# Patient Record
Sex: FEMALE | Race: WHITE | NOT HISPANIC OR LATINO | URBAN - METROPOLITAN AREA
[De-identification: names, ages, dates, MRNs, and addresses within clinical notes are randomized per-mention and may not be internally consistent; named-entity substitution may affect disease eponyms.]

---

## 2017-01-16 ENCOUNTER — OFFICE VISIT - HEALTHEAST (OUTPATIENT)
Dept: FAMILY MEDICINE | Facility: CLINIC | Age: 37
End: 2017-01-16

## 2017-01-16 DIAGNOSIS — Z00.00 ANNUAL PHYSICAL EXAM: ICD-10-CM

## 2017-01-16 DIAGNOSIS — J45.20 INTERMITTENT ASTHMA: ICD-10-CM

## 2017-01-16 DIAGNOSIS — Z80.3 FAMILY HISTORY OF BREAST CANCER: ICD-10-CM

## 2017-01-16 DIAGNOSIS — Z31.9 INFERTILITY MANAGEMENT: ICD-10-CM

## 2017-01-16 ASSESSMENT — MIFFLIN-ST. JEOR: SCORE: 1486.89

## 2017-01-22 ENCOUNTER — COMMUNICATION - HEALTHEAST (OUTPATIENT)
Dept: FAMILY MEDICINE | Facility: CLINIC | Age: 37
End: 2017-01-22

## 2017-01-23 ENCOUNTER — COMMUNICATION - HEALTHEAST (OUTPATIENT)
Dept: ONCOLOGY | Facility: HOSPITAL | Age: 37
End: 2017-01-23

## 2017-02-13 ENCOUNTER — COMMUNICATION - HEALTHEAST (OUTPATIENT)
Dept: SCHEDULING | Facility: CLINIC | Age: 37
End: 2017-02-13

## 2017-02-20 ENCOUNTER — OFFICE VISIT - HEALTHEAST (OUTPATIENT)
Dept: ONCOLOGY | Facility: HOSPITAL | Age: 37
End: 2017-02-20

## 2017-02-20 ENCOUNTER — RECORDS - HEALTHEAST (OUTPATIENT)
Dept: ADMINISTRATIVE | Facility: OTHER | Age: 37
End: 2017-02-20

## 2017-02-20 ENCOUNTER — COMMUNICATION - HEALTHEAST (OUTPATIENT)
Dept: ONCOLOGY | Facility: HOSPITAL | Age: 37
End: 2017-02-20

## 2017-02-20 DIAGNOSIS — Z80.3 FAMILY HISTORY OF BREAST CANCER: ICD-10-CM

## 2017-03-09 ENCOUNTER — COMMUNICATION - HEALTHEAST (OUTPATIENT)
Dept: FAMILY MEDICINE | Facility: CLINIC | Age: 37
End: 2017-03-09

## 2017-03-13 ENCOUNTER — RECORDS - HEALTHEAST (OUTPATIENT)
Dept: ADMINISTRATIVE | Facility: OTHER | Age: 37
End: 2017-03-13

## 2017-05-17 ENCOUNTER — COMMUNICATION - HEALTHEAST (OUTPATIENT)
Dept: SCHEDULING | Facility: CLINIC | Age: 37
End: 2017-05-17

## 2017-05-17 ENCOUNTER — OFFICE VISIT - HEALTHEAST (OUTPATIENT)
Dept: FAMILY MEDICINE | Facility: CLINIC | Age: 37
End: 2017-05-17

## 2017-05-17 DIAGNOSIS — R07.9 CHEST PAIN, UNSPECIFIED TYPE: ICD-10-CM

## 2017-05-17 ASSESSMENT — MIFFLIN-ST. JEOR: SCORE: 1477.81

## 2017-05-22 ENCOUNTER — RECORDS - HEALTHEAST (OUTPATIENT)
Dept: GENERAL RADIOLOGY | Facility: CLINIC | Age: 37
End: 2017-05-22

## 2017-05-22 ENCOUNTER — OFFICE VISIT - HEALTHEAST (OUTPATIENT)
Dept: FAMILY MEDICINE | Facility: CLINIC | Age: 37
End: 2017-05-22

## 2017-05-22 DIAGNOSIS — J93.83 SPONTANEOUS PNEUMOTHORAX: ICD-10-CM

## 2017-05-22 DIAGNOSIS — R06.02 SHORTNESS OF BREATH: ICD-10-CM

## 2017-05-22 DIAGNOSIS — J93.83 OTHER PNEUMOTHORAX: ICD-10-CM

## 2017-05-22 RX ORDER — ALBUTEROL SULFATE 90 UG/1
2 AEROSOL, METERED RESPIRATORY (INHALATION) EVERY 6 HOURS PRN
Qty: 1 INHALER | Refills: 1 | Status: SHIPPED | OUTPATIENT
Start: 2017-05-22

## 2017-05-26 ENCOUNTER — OFFICE VISIT - HEALTHEAST (OUTPATIENT)
Dept: PULMONOLOGY | Facility: OTHER | Age: 37
End: 2017-05-26

## 2017-05-26 ENCOUNTER — HOSPITAL ENCOUNTER (OUTPATIENT)
Dept: RADIOLOGY | Facility: HOSPITAL | Age: 37
Discharge: HOME | End: 2017-05-26
Attending: INTERNAL MEDICINE

## 2017-05-26 ENCOUNTER — COMMUNICATION - HEALTHEAST (OUTPATIENT)
Dept: TELEHEALTH | Facility: CLINIC | Age: 37
End: 2017-05-26

## 2017-05-26 ENCOUNTER — RECORDS - HEALTHEAST (OUTPATIENT)
Dept: ADMINISTRATIVE | Facility: OTHER | Age: 37
End: 2017-05-26

## 2017-05-26 DIAGNOSIS — J93.9 PNEUMOTHORAX ON RIGHT: ICD-10-CM

## 2017-05-26 DIAGNOSIS — J45.909 ASTHMA: ICD-10-CM

## 2017-06-19 ENCOUNTER — HOSPITAL ENCOUNTER (OUTPATIENT)
Dept: RADIOLOGY | Facility: HOSPITAL | Age: 37
Discharge: HOME | End: 2017-06-19
Attending: INTERNAL MEDICINE

## 2017-06-19 ENCOUNTER — OFFICE VISIT - HEALTHEAST (OUTPATIENT)
Dept: PULMONOLOGY | Facility: OTHER | Age: 37
End: 2017-06-19

## 2017-06-19 DIAGNOSIS — J45.990 EXERCISE-INDUCED ASTHMA: ICD-10-CM

## 2017-06-19 DIAGNOSIS — J93.9 PNEUMOTHORAX: ICD-10-CM

## 2017-06-19 DIAGNOSIS — J45.909 ASTHMA: ICD-10-CM

## 2017-06-19 DIAGNOSIS — J93.9 PNEUMOTHORAX ON RIGHT: ICD-10-CM

## 2017-08-09 ENCOUNTER — COMMUNICATION - HEALTHEAST (OUTPATIENT)
Dept: SCHEDULING | Facility: CLINIC | Age: 37
End: 2017-08-09

## 2017-08-10 ENCOUNTER — OFFICE VISIT - HEALTHEAST (OUTPATIENT)
Dept: FAMILY MEDICINE | Facility: CLINIC | Age: 37
End: 2017-08-10

## 2017-08-10 ENCOUNTER — RECORDS - HEALTHEAST (OUTPATIENT)
Dept: ADMINISTRATIVE | Facility: OTHER | Age: 37
End: 2017-08-10

## 2017-08-10 ENCOUNTER — ANESTHESIA - HEALTHEAST (OUTPATIENT)
Dept: SURGERY | Facility: CLINIC | Age: 37
End: 2017-08-10

## 2017-08-10 DIAGNOSIS — J93.83 SPONTANEOUS PNEUMOTHORAX: ICD-10-CM

## 2017-08-10 DIAGNOSIS — J30.2 SEASONAL ALLERGIES: ICD-10-CM

## 2017-08-10 DIAGNOSIS — L70.9 ACNE: ICD-10-CM

## 2017-08-10 DIAGNOSIS — R05.9 COUGH: ICD-10-CM

## 2017-08-10 DIAGNOSIS — J45.20 INTERMITTENT ASTHMA: ICD-10-CM

## 2017-08-10 ASSESSMENT — MIFFLIN-ST. JEOR
SCORE: 1482.35
SCORE: 1487.43

## 2017-08-11 ENCOUNTER — SURGERY - HEALTHEAST (OUTPATIENT)
Dept: SURGERY | Facility: CLINIC | Age: 37
End: 2017-08-11

## 2017-08-11 ASSESSMENT — MIFFLIN-ST. JEOR: SCORE: 1481.44

## 2017-08-12 ASSESSMENT — MIFFLIN-ST. JEOR: SCORE: 1494.6

## 2017-08-16 ENCOUNTER — COMMUNICATION - HEALTHEAST (OUTPATIENT)
Dept: FAMILY MEDICINE | Facility: CLINIC | Age: 37
End: 2017-08-16

## 2017-08-19 ENCOUNTER — COMMUNICATION - HEALTHEAST (OUTPATIENT)
Dept: SCHEDULING | Facility: CLINIC | Age: 37
End: 2017-08-19

## 2017-08-21 ENCOUNTER — COMMUNICATION - HEALTHEAST (OUTPATIENT)
Dept: PULMONOLOGY | Facility: OTHER | Age: 37
End: 2017-08-21

## 2017-08-21 ENCOUNTER — RECORDS - HEALTHEAST (OUTPATIENT)
Dept: GENERAL RADIOLOGY | Facility: CLINIC | Age: 37
End: 2017-08-21

## 2017-08-21 ENCOUNTER — AMBULATORY - HEALTHEAST (OUTPATIENT)
Dept: PULMONOLOGY | Facility: OTHER | Age: 37
End: 2017-08-21

## 2017-08-21 ENCOUNTER — OFFICE VISIT - HEALTHEAST (OUTPATIENT)
Dept: FAMILY MEDICINE | Facility: CLINIC | Age: 37
End: 2017-08-21

## 2017-08-21 DIAGNOSIS — R09.89 OTHER SPECIFIED SYMPTOMS AND SIGNS INVOLVING THE CIRCULATORY AND RESPIRATORY SYSTEMS: ICD-10-CM

## 2017-08-21 DIAGNOSIS — Z09 ENCOUNTER FOR FOLLOW-UP EXAMINATION AFTER COMPLETED TREATMENT FOR CONDITIONS OTHER THAN MALIGNANT NEOPLASM: ICD-10-CM

## 2017-08-21 DIAGNOSIS — R06.89 DECREASED BREATH SOUNDS IN MIDDLE FIELD ON RIGHT SIDE OF CHEST: ICD-10-CM

## 2017-08-21 DIAGNOSIS — J93.9 PNEUMOTHORAX: ICD-10-CM

## 2017-08-21 DIAGNOSIS — Z09 FOLLOW-UP EXAMINATION AFTER LUNG SURGERY: ICD-10-CM

## 2017-08-21 ASSESSMENT — MIFFLIN-ST. JEOR
SCORE: 1455.13
SCORE: 1473.28

## 2017-08-22 ENCOUNTER — SURGERY - HEALTHEAST (OUTPATIENT)
Dept: SURGERY | Facility: CLINIC | Age: 37
End: 2017-08-22

## 2017-08-22 ENCOUNTER — ANESTHESIA - HEALTHEAST (OUTPATIENT)
Dept: SURGERY | Facility: CLINIC | Age: 37
End: 2017-08-22

## 2017-08-23 ASSESSMENT — MIFFLIN-ST. JEOR: SCORE: 1460.58

## 2017-08-26 ASSESSMENT — MIFFLIN-ST. JEOR: SCORE: 1532.24

## 2017-08-27 ENCOUNTER — ANESTHESIA - HEALTHEAST (OUTPATIENT)
Dept: SURGERY | Facility: CLINIC | Age: 37
End: 2017-08-27

## 2017-08-28 ASSESSMENT — MIFFLIN-ST. JEOR
SCORE: 1533.61
SCORE: 1527.71

## 2017-08-30 ENCOUNTER — COMMUNICATION - HEALTHEAST (OUTPATIENT)
Dept: FAMILY MEDICINE | Facility: CLINIC | Age: 37
End: 2017-08-30

## 2017-08-31 ENCOUNTER — AMBULATORY - HEALTHEAST (OUTPATIENT)
Dept: PULMONOLOGY | Facility: OTHER | Age: 37
End: 2017-08-31

## 2017-08-31 DIAGNOSIS — J93.9 PNEUMOTHORAX ON RIGHT: ICD-10-CM

## 2017-09-01 ENCOUNTER — COMMUNICATION - HEALTHEAST (OUTPATIENT)
Dept: PULMONOLOGY | Facility: OTHER | Age: 37
End: 2017-09-01

## 2017-09-03 ENCOUNTER — COMMUNICATION - HEALTHEAST (OUTPATIENT)
Dept: PULMONOLOGY | Facility: OTHER | Age: 37
End: 2017-09-03

## 2017-09-05 ENCOUNTER — OFFICE VISIT - HEALTHEAST (OUTPATIENT)
Dept: FAMILY MEDICINE | Facility: CLINIC | Age: 37
End: 2017-09-05

## 2017-09-05 ENCOUNTER — RECORDS - HEALTHEAST (OUTPATIENT)
Dept: GENERAL RADIOLOGY | Facility: CLINIC | Age: 37
End: 2017-09-05

## 2017-09-05 DIAGNOSIS — J93.9 PNEUMOTHORAX, UNSPECIFIED: ICD-10-CM

## 2017-09-05 DIAGNOSIS — J93.83 SPONTANEOUS PNEUMOTHORAX: ICD-10-CM

## 2017-09-05 DIAGNOSIS — J93.11 PRIMARY SPONTANEOUS PNEUMOTHORAX: ICD-10-CM

## 2017-09-06 ENCOUNTER — AMBULATORY - HEALTHEAST (OUTPATIENT)
Dept: PULMONOLOGY | Facility: OTHER | Age: 37
End: 2017-09-06

## 2017-09-06 ENCOUNTER — RECORDS - HEALTHEAST (OUTPATIENT)
Dept: ADMINISTRATIVE | Facility: OTHER | Age: 37
End: 2017-09-06

## 2017-09-07 ENCOUNTER — COMMUNICATION - HEALTHEAST (OUTPATIENT)
Dept: FAMILY MEDICINE | Facility: CLINIC | Age: 37
End: 2017-09-07

## 2017-09-12 ENCOUNTER — COMMUNICATION - HEALTHEAST (OUTPATIENT)
Dept: SCHEDULING | Facility: CLINIC | Age: 37
End: 2017-09-12

## 2017-09-12 DIAGNOSIS — L70.9 ACNE: ICD-10-CM

## 2017-09-12 DIAGNOSIS — L98.9 SKIN LESION: ICD-10-CM

## 2017-09-26 ENCOUNTER — OFFICE VISIT - HEALTHEAST (OUTPATIENT)
Dept: FAMILY MEDICINE | Facility: CLINIC | Age: 37
End: 2017-09-26

## 2017-09-26 ENCOUNTER — RECORDS - HEALTHEAST (OUTPATIENT)
Dept: GENERAL RADIOLOGY | Facility: CLINIC | Age: 37
End: 2017-09-26

## 2017-09-26 ENCOUNTER — COMMUNICATION - HEALTHEAST (OUTPATIENT)
Dept: SCHEDULING | Facility: CLINIC | Age: 37
End: 2017-09-26

## 2017-09-26 DIAGNOSIS — R06.02 SOB (SHORTNESS OF BREATH): ICD-10-CM

## 2017-09-26 DIAGNOSIS — R06.02 SHORTNESS OF BREATH: ICD-10-CM

## 2017-09-27 ENCOUNTER — COMMUNICATION - HEALTHEAST (OUTPATIENT)
Dept: PULMONOLOGY | Facility: OTHER | Age: 37
End: 2017-09-27

## 2017-09-29 ENCOUNTER — COMMUNICATION - HEALTHEAST (OUTPATIENT)
Dept: PULMONOLOGY | Facility: OTHER | Age: 37
End: 2017-09-29

## 2017-10-19 ENCOUNTER — OFFICE VISIT - HEALTHEAST (OUTPATIENT)
Dept: PULMONOLOGY | Facility: OTHER | Age: 37
End: 2017-10-19

## 2017-10-19 ENCOUNTER — COMMUNICATION - HEALTHEAST (OUTPATIENT)
Dept: FAMILY MEDICINE | Facility: CLINIC | Age: 37
End: 2017-10-19

## 2017-10-19 ENCOUNTER — RECORDS - HEALTHEAST (OUTPATIENT)
Dept: ADMINISTRATIVE | Facility: OTHER | Age: 37
End: 2017-10-19

## 2017-10-19 DIAGNOSIS — J93.9 PNEUMOTHORAX: ICD-10-CM

## 2017-11-16 ENCOUNTER — COMMUNICATION - HEALTHEAST (OUTPATIENT)
Dept: SCHEDULING | Facility: CLINIC | Age: 37
End: 2017-11-16

## 2017-11-17 ENCOUNTER — RECORDS - HEALTHEAST (OUTPATIENT)
Dept: GENERAL RADIOLOGY | Facility: CLINIC | Age: 37
End: 2017-11-17

## 2017-11-17 ENCOUNTER — OFFICE VISIT - HEALTHEAST (OUTPATIENT)
Dept: FAMILY MEDICINE | Facility: CLINIC | Age: 37
End: 2017-11-17

## 2017-11-17 ENCOUNTER — OFFICE VISIT - HEALTHEAST (OUTPATIENT)
Dept: PULMONOLOGY | Facility: OTHER | Age: 37
End: 2017-11-17

## 2017-11-17 ENCOUNTER — RECORDS - HEALTHEAST (OUTPATIENT)
Dept: ADMINISTRATIVE | Facility: OTHER | Age: 37
End: 2017-11-17

## 2017-11-17 DIAGNOSIS — Z80.3 FAMILY HISTORY OF BREAST CANCER: ICD-10-CM

## 2017-11-17 DIAGNOSIS — J93.83 SPONTANEOUS PNEUMOTHORAX: ICD-10-CM

## 2017-11-17 DIAGNOSIS — J45.20 MILD INTERMITTENT ASTHMA WITHOUT COMPLICATION: ICD-10-CM

## 2017-11-17 DIAGNOSIS — J93.83 OTHER PNEUMOTHORAX: ICD-10-CM

## 2017-11-17 DIAGNOSIS — R06.00 DYSPNEA: ICD-10-CM

## 2017-11-17 DIAGNOSIS — J45.20 MILD INTERMITTENT ASTHMA, UNCOMPLICATED: ICD-10-CM

## 2017-11-17 DIAGNOSIS — R06.02 SHORTNESS OF BREATH: ICD-10-CM

## 2017-11-17 DIAGNOSIS — R07.1 CHEST PAIN ON BREATHING: ICD-10-CM

## 2017-11-17 LAB
ATRIAL RATE - MUSE: 69 BPM
DIASTOLIC BLOOD PRESSURE - MUSE: NORMAL MMHG
INTERPRETATION ECG - MUSE: NORMAL
P AXIS - MUSE: 27 DEGREES
PR INTERVAL - MUSE: 142 MS
QRS DURATION - MUSE: 86 MS
QT - MUSE: 424 MS
QTC - MUSE: 454 MS
R AXIS - MUSE: -16 DEGREES
SYSTOLIC BLOOD PRESSURE - MUSE: NORMAL MMHG
T AXIS - MUSE: 53 DEGREES
VENTRICULAR RATE- MUSE: 69 BPM

## 2017-11-17 ASSESSMENT — MIFFLIN-ST. JEOR: SCORE: 1486.89

## 2017-12-04 ENCOUNTER — OFFICE VISIT - HEALTHEAST (OUTPATIENT)
Dept: FAMILY MEDICINE | Facility: CLINIC | Age: 37
End: 2017-12-04

## 2017-12-04 DIAGNOSIS — J32.9 SINUSITIS: ICD-10-CM

## 2017-12-04 DIAGNOSIS — J02.9 SORE THROAT: ICD-10-CM

## 2017-12-04 DIAGNOSIS — J06.9 ACUTE URI: ICD-10-CM

## 2018-02-02 ENCOUNTER — OFFICE VISIT - HEALTHEAST (OUTPATIENT)
Dept: FAMILY MEDICINE | Facility: CLINIC | Age: 38
End: 2018-02-02

## 2018-02-02 DIAGNOSIS — R05.9 COUGH: ICD-10-CM

## 2018-02-02 LAB
FLUAV AG SPEC QL IA: NORMAL
FLUBV AG SPEC QL IA: NORMAL

## 2018-02-02 ASSESSMENT — MIFFLIN-ST. JEOR: SCORE: 1500.49

## 2018-03-16 ENCOUNTER — OFFICE VISIT - HEALTHEAST (OUTPATIENT)
Dept: FAMILY MEDICINE | Facility: CLINIC | Age: 38
End: 2018-03-16

## 2018-03-16 DIAGNOSIS — R42 DIZZINESS: ICD-10-CM

## 2018-03-16 DIAGNOSIS — G89.18 POST-OPERATIVE PAIN: ICD-10-CM

## 2018-03-16 DIAGNOSIS — Z31.9 INFERTILITY MANAGEMENT: ICD-10-CM

## 2018-03-16 DIAGNOSIS — J93.83 SPONTANEOUS PNEUMOTHORAX: ICD-10-CM

## 2018-03-16 DIAGNOSIS — Z00.00 ANNUAL PHYSICAL EXAM: ICD-10-CM

## 2018-03-16 DIAGNOSIS — J02.9 SORE THROAT: ICD-10-CM

## 2018-03-16 DIAGNOSIS — J45.20 MILD INTERMITTENT ASTHMA WITHOUT COMPLICATION: ICD-10-CM

## 2018-03-16 LAB
CHOLEST SERPL-MCNC: 153 MG/DL
DEPRECATED S PYO AG THROAT QL EIA: NORMAL
FASTING STATUS PATIENT QL REPORTED: YES
FASTING STATUS PATIENT QL REPORTED: YES
GLUCOSE BLD-MCNC: 97 MG/DL (ref 70–99)
HDLC SERPL-MCNC: 62 MG/DL
HGB BLD-MCNC: 14 G/DL (ref 12–16)
LDLC SERPL CALC-MCNC: 80 MG/DL
TRIGL SERPL-MCNC: 54 MG/DL

## 2018-03-16 ASSESSMENT — MIFFLIN-ST. JEOR: SCORE: 1489.15

## 2018-03-17 LAB — GROUP A STREP BY PCR: NORMAL

## 2018-03-28 ENCOUNTER — COMMUNICATION - HEALTHEAST (OUTPATIENT)
Dept: FAMILY MEDICINE | Facility: CLINIC | Age: 38
End: 2018-03-28

## 2018-04-09 ENCOUNTER — COMMUNICATION - HEALTHEAST (OUTPATIENT)
Dept: SCHEDULING | Facility: CLINIC | Age: 38
End: 2018-04-09

## 2021-05-30 VITALS — HEIGHT: 71 IN | BODY MASS INDEX: 22.4 KG/M2 | WEIGHT: 160 LBS

## 2021-05-31 VITALS — BODY MASS INDEX: 22.32 KG/M2 | WEIGHT: 160 LBS

## 2021-05-31 VITALS — BODY MASS INDEX: 22.42 KG/M2 | WEIGHT: 160.12 LBS | HEIGHT: 71 IN

## 2021-05-31 VITALS — WEIGHT: 158 LBS | BODY MASS INDEX: 22.12 KG/M2 | HEIGHT: 71 IN

## 2021-05-31 VITALS — BODY MASS INDEX: 22.4 KG/M2 | HEIGHT: 71 IN | WEIGHT: 160 LBS

## 2021-05-31 VITALS — HEIGHT: 71 IN | BODY MASS INDEX: 23.66 KG/M2 | WEIGHT: 169 LBS

## 2021-05-31 VITALS — BODY MASS INDEX: 21.34 KG/M2 | WEIGHT: 153 LBS

## 2021-05-31 VITALS — WEIGHT: 158.8 LBS | BODY MASS INDEX: 22.15 KG/M2

## 2021-05-31 VITALS — WEIGHT: 160.3 LBS | BODY MASS INDEX: 22.36 KG/M2

## 2021-05-31 VITALS — BODY MASS INDEX: 22.18 KG/M2 | WEIGHT: 159 LBS

## 2021-05-31 VITALS — WEIGHT: 160 LBS | BODY MASS INDEX: 22.32 KG/M2

## 2021-05-31 VITALS — BODY MASS INDEX: 21.62 KG/M2 | WEIGHT: 155 LBS

## 2021-05-31 VITALS — WEIGHT: 156 LBS | BODY MASS INDEX: 21.76 KG/M2

## 2021-05-31 VITALS — BODY MASS INDEX: 22.33 KG/M2 | WEIGHT: 160.12 LBS

## 2021-05-31 VITALS — BODY MASS INDEX: 22.64 KG/M2 | WEIGHT: 161.7 LBS | HEIGHT: 71 IN

## 2021-06-01 VITALS — HEIGHT: 71 IN | WEIGHT: 163 LBS | BODY MASS INDEX: 22.82 KG/M2

## 2021-06-01 VITALS — BODY MASS INDEX: 21.26 KG/M2 | HEIGHT: 72 IN | WEIGHT: 157 LBS

## 2021-06-08 NOTE — PROGRESS NOTES
"  Subjective:     Anupama Miranda is a 36 y.o. female who presents for an annual exam.     Other concerns today:  1.  Infertility.  She has had ongoing problems with infertility.  Please see her last note for details.  She has had some infertility workup done at her previous clinic.  I referred her to [facility] recently for follow-up.  Unfortunately, patient has had a lot of difficulty with the provider she is seeing there.  She is seeing a nurse practitioner named Thuy.  Patient has , which often necessitates more detailed paperwork for insurance.  Patient says there have been 2 times where the provider has entered the wrong paperwork, patient has had poor follow-up with the provider, she's had to call back and forth on multiple occasions.  Also her old clinic apparently sent the provider a CD of patient's old records, and the records apparently never showed up.  She has not heard anything as to her next steps in the process.  She thinks she will next get an ultrasound and some bloodwork in relation to menstrual cycle timing.  She is frustrated.  She feels like she has wasted all of this time.  She would be amenable to seeing a different provider at [facility], or switching to another clinic.  Overall, she denies any significant problems with anxiety or depression.  She is frustrated with this specific incident or series of events.    2.  Family history of breast cancer.  Her maternal aunt was just recently diagnosed breast cancer at age 55.  She has a family history of maternal grandmother with breast cancer.  Patient had a \"baseline\" mammogram done at age 34 which was grossly normal, she reports.  Patient is a nonsmoker.  Her aunt got some genetic testing done.  I reviewed emails she sent to patient today.  It did not look like aunt tested positive for  BRCA1/2 gene(s), but possibly some other at-risk gene?  Patient wants to know if she is at higher risk, if she needs extra/different " "screening.    Immunization History   Administered Date(s) Administered     Influenza, inj, historic 10/18/2016     Gynecologic History  Patient's last menstrual period was 12/31/2016.  Contraception: none  Last Pap: 1/1/2016  Last mammogram: 2015, \"baseline\". Results were: normal per pt report    Current Outpatient Prescriptions:      albuterol (PROVENTIL HFA;VENTOLIN HFA) 90 mcg/actuation inhaler, Inhale 2 puffs every 6 (six) hours as needed for wheezing., Disp: , Rfl:      clindamycin (CLEOCIN T) 1 % lotion, Apply topically 2 (two) times a day., Disp: , Rfl:      FINACEA 15 % gel, , Disp: , Rfl:      tretinoin (RETIN-A) 0.05 % cream, Apply topically bedtime., Disp: , Rfl:   Past Medical History   Diagnosis Date     Miscarriage      Past Surgical History   Procedure Laterality Date     Dilation and curettage of uterus       Ambien [zolpidem]  Family History   Problem Relation Age of Onset     Breast cancer Maternal Grandmother      Stroke Maternal Grandmother      Hypertension Maternal Grandfather      Coronary artery disease Maternal Grandfather      Diabetes Paternal Grandmother      Alcohol abuse Paternal Aunt      Breast cancer Maternal Aunt      Diabetes Paternal Uncle      Social History     Social History     Marital status:      Spouse name: N/A     Number of children: N/A     Years of education: N/A     Occupational History     Not on file.     Social History Main Topics     Smoking status: Never Smoker     Smokeless tobacco: Not on file     Alcohol use Not on file     Drug use: Not on file     Sexual activity: Not on file     Other Topics Concern     Not on file     Social History Narrative     Review of Systems  Complete Review of Systems is discussed with patient and is negative except as noted in HPI.    Objective:     Vitals:    01/16/17 1016   BP: 104/72   Pulse: 60   Resp: 16   Temp: 97.6  F (36.4  C)   SpO2: 98%     Body mass index is 22.32 kg/(m^2).    Physical Exam:  General: Patient is " alert and Oriented x 3, in no apparent distress, appropriately groomed with normal affect  HEENT, Thyroid, Lymphatic, Cardiac, Pulmonary, GI, Musculoskeletal, and Neuro exams were completed today and grossly normal.  Breast Exam: No lumps, skin changes, lymphadenopathy, or nipple discharge noted bilaterally.  Genitourinary Exam: deferred    Assessment and Plan:     1. Physical Exam.  Health Maintenance discussed with patient as appropriate for age and risk factors.  Pap smear is up-to-date.  She declines STD screening.  She is not fasting today.  Lab only orders will be placed for fasting screening lipids and glucose.    2.  Infertility.  If we can have her see a different provider [facility], I think that would be the most expedient option.  Patient is agreeable to this.  I will contact them and we will notify her for follow-up.  She believes the next step would be to get an ultrasound and lab work done at certain times, based on her menstrual cycle.    3.  Family history of breast cancer.  Maternal aunt just diagnosed breast cancer at age 55.  Maternal grandmother with breast cancer.  I think having her see a genetic counselor would be the best and most thorough option.  Referral will be made today.  Patient is agreeable to this plan.    4.  Intermittent asthma.  Well-controlled on present medications.    This dictation uses voice recognition software, which may contain typographical errors.

## 2021-06-09 NOTE — PROGRESS NOTES
[facility] GENETIC COUNSELING: CONSULT SUMMARY  Patient: Anupama Miranda (1980 / 36 y.o., female) MRN: [id number]   [address]  [place] [address]      Date/Location of Visit: 2/20/2017, [facility]  Care Provider: Rocío Garcia MS, [facility] [place] ([phone], [email or web address])  Reason for Visit: family history of breast cancer and aunt with genetic testing recently completed  Referring Provider: Deya Arteaga PA-C  Present: Anupama     PRESENTING CONCERN: Anupama presents today for evaluation of her family history of breast cancer from a genetic standpoint.  Anupama's maternal aunt was recently diagnosed with breast cancer and has provided detailed information to the family, including genetic test results.  Anupama presents for explanation of how those results might impact her and also recommendations in regards to breast surveillance in the setting of her family history.    MEDICAL:   Anupama reports having had a baseline mammogram at age 34 back in [place].  She states there was some density noted but no abnormalities and no further follow-up was needed.  She has not yet required colonoscopy screening.  Her uterus and ovaries remain in place.  Anupama reports first menstrual period at age 11 and she is currently premenopausal.  One prior pregnancy that resulted in first trimester miscarriage.  No history of smoking.  No prior transplant or recent transfusion.    FAMILY: Three-generation pedigree was taken to assess cancer risk using information provided by Anupama and is scanned into her chart.  The importance of accurate and verified history was emphasized.  Anupama's maternal aunt, Mariella, was recently diagnosed with breast cancer at age 55.  Mariella has provided the family with pathology and genetic testing records which confirm that her cancer was a grade 2 infiltrating ductal carcinoma with estrogen and progesterone receptors positive and  "HER-2/darleen negative.  Mariella had genetic testing through [facility] in 2016 which included sequencing and deletion/duplication analysis of BRCA1, BRCA2, CHARLA, CHEK2, CDH1, PALB2, PTEN, and TP53.  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  There are no enteries to the ClinVar database on this specific variant.  There were no mutations or other VUS findings in the other 7 genes analyzed.      Anupama has another maternal aunt who had basal cell skin cancer in her 50s and who has also had a breast biopsy with atypical hyperplasia and seemingly a breast cyst with atypical cells as well.  This aunt used tamoxifen from [id number].  Anupama's maternal grandmother had breast cancer at 69 and multiple basal cell skin cancers.  A sister to this grandmother (Anupama's great-aunt) had breast cancer at age 80 and another had lung cancer in her 60s and was a smoker.  A brother (Anupama's great-uncle) was recently diagnosed with a bile duct cancer at age 75.  Anupama's great grandparents in this lineage both had cancers, her great-grandmother  in her 80s of a \"bone\" cancer and her great-grandfather had cancer on his lip which is reported as skin cancer and he had smoked cigars.  Anupama's maternal grandfather had an unspecified lung disease perhaps related to his occupation as a  and also had prostate cancer in his 60s.  One of his nieces (a first-cousin once-removed to Anupama) had breast cancer in her 60s.  Anupama's great-grandfather in this branch  of colon cancer in his 60s.  There is only one cancer known on Anupama's paternal side and this is a stomach cancer which her grandmother was diagnosed with in her 80s.  Anupama is of  descent on her maternal side and [place] descent on her paternal side with no Mosque ancestry and no consanguinity.    HISTORY ASSESSMENT: Anupama's family history is " significant for a clustering of breast cancer on her maternal side including an aunt, grandmother, and great-aunt.  However, it is also pointed out that these have all been diagnosed at later ages (after 50) and in second- or third-degree relatives to Anupama (Anupama's own mother is unaffected), lowering suspicion for Anupama having inherited a major autosomal dominant hereditary breast cancer syndrome.  In addition, results of genetic testing for Anupama's maternal aunt did not yield any such diagnosis.  In evaluating the family history we discussed differences between sporadic, environmental, and genetic contributions to cancer risk including relevant genetic principals and inheritance patterns.  I emphasized that the factors underlying cancer diagnosis are often multiple and complex.    GENETIC TESTING:  Further genetic testing is not clearly warranted in the family at this point.  Testing Anupama or other family members for the variant in CDH1 is not recommended as it will not help to inform cancer risks or appropriate medical care.  I emphasized that not all genetic changes are harmful and there are currently insufficient data to determine if the p.D881N variant in CDH1 is  disease-causing or simply represents benign individual variation.  If in the future the lab finds this variant to be clinically significant they will re-issue a report to Anupama's aunt's providers.  Further classification of this finding may take months or often years.  Notably, Anupama's family history is not consistent with what might be expected with a mutation in CDH1 which would involve elevated risk for breast and stomach cancers of specific pathology (lobular and diffuse, respectively).      Aside from this, the remainder of the family history does not meet NCCN guidelines for additional hereditary cancer testing.  Therefore, cancer risk assessment and surveillance for Anupama is best evaluated by her personal and family  history, rather than genetic testing at this time.      RISK ASSESSMENT:  On the basis of personal/family history Anupama's remaining lifetime risk for breast cancer is estimated at up to about 24% to age 85 with risk over the next 10 years of 2-3% (Enrique/TONOISv7).  Other family history driven models suggest lifetime risk of about 13-14%, much closer to that of the average 37-year-old (Harry CLEMENTE).  This highlights the strengths and limitations of various models available and Anupama's actual risk is likely somewhere between that predicted by the different methods.  I note that no model will incorporate the breast cancer in Anupama's great-aunt since she is a third-degree relative or the fact that Anupama's maternal aunt reduced risk with chemoprevention (tamoxifen).  Five-year breast cancer risk of less than 1% is estimated for Anupama using primarily hormone/reproductive history (Kimmy).  Overall, this assessment represents at most a moderate elevation in breast cancer risk for Anupama compared to that of the average 37 y.o. (typical lifetime risk of 12-13%, 10-year risk of 1-2%, and 5-year risk of less than 1%).      Anupama's assessment spans the threshold (greater than 20% lifetime risk) for recommending annual breast MRIs in addition to annual mammograms.  In this case Anupama and her provider(s) may participate in shared-decision making regarding whether to pursue MRI surveillance and also whether Anupama might benefit from 3-dimensional digital mammography (tomosynthesis) which may be considered especially in young women and women with dense breasts.      For most women, breast imaging will be offered beginning by age 40 and there are no young diagnoses in Anupama's family to prompt earlier screening for her.  For now, Anupama should continue to have clinical visits with a healthcare provider that include breast exam at least every 6-12 months.  Anupama should be familiar with her  breasts and promptly report changes to a healthcare provider.      Risk-reducing anti-estrogen medications may be an option in the future but are not currently indicated by 5-year risk.     Continuation of general population screening for other cancers per ACS/NCCN guidelines is also appropriate at a minimum.  Important lifestyle considerations include regular exercise, maintaining a healthy diet and weight, limiting alcohol consumption, avoidance/cessation of smoking, use of safety precautions in the sun, and ensuring adequate vitamin D.    PLAN: No genetic testing or further follow-up in our clinic is planned for Anupama at this time.  She is in agreement with the aforementioned surveillance and will discuss this with her personal care providers more as she nears age 39.  She will receive a copy of this summary herself and can also have one forwarded from her record if she is re-located (her  is in the ).  I have also encouraged Anupama to contact our clinic by phone for updated evaluation as she nears age 39 and then every few years after that as recommendations and guidelines are expected to evolve over time.  Anupama should contact our clinic with changes to personal/family history as well.  The clinic does not routinely re-contact individual patients with an increase or change in knowledge.  It was a pleasure to meet Anupama today and to participate in her care.  I am of course happy to address any follow-up questions/concerns should these arise for Anupama or her providers.          CORBIN DE LOS SANTOS MS, [facility] [place]  Certified Genetic Counselor  [facility]    Handouts or Enclosures:  contact information    Face-to-face time: 60 minutes    cc:   Anupama Arteaga PA-C

## 2021-06-10 NOTE — PROGRESS NOTES
Asthma flare.  Bid alb last couple days.    Used neb effective right away.  Some chest pain with the flare.   Couple hours better after a neb.  No fevers.  Some cough.  Nonproductive.  No new or unusual exposures.  The alb inhaler  may of 2015.    Prior dx as exercise induced asthma.  Does a variety.  intermittent use depending on a number of factors.      No cig.    No other med issues other than infertility.    Heaviness in chest.  Worse with activity.  Even walking down [address].  Slows talking and walking to avoid.   Heaviness is worse and harder to breathe.    Drove self.    Usually very active and today feels like hit by truck.  Worst is chest pressure.    Works as special     No known cardiac risk factors    ROS: as noted above    OBJECTIVE:   Vitals:    17 1416   BP: 102/78   Pulse: 61   Resp: 20   Temp: 97.3  F (36.3  C)   SpO2: 100%    well groomed but very uncomfortable appearing 35 yo with soft speech and restricted motion.  Head: atraumatic   Eyes: nl eom, anicteric   Ears: nl external ears tms  Neck: nl nodes, supple   Lungs: clear to ausc    No wheeze or rales.  Has normal air exchange    Oxy sat 100 percent    Heart: regular rhythm although initially had some ? Unusual extra sounds  Back: no tenderness  No chest wall tenderness  Abd: soft nontender   Joints: uninflamed   Ext: nontender calves   Mental: anxious  Neuro: no weakness  Gait: slow    ?marfanoid    ASSESSMENT/PLAN:    Additional diagnoses and related orders:  1. Chest pain, unspecified type       Chest pain exertional in 35 yo female.  ? Marfanoid    No known cardiac risk factors    R/o pe, dissection to be considered/  Discussed with ED and sent to [facility]   Pt will not drive on her own.   No difficulties

## 2021-06-10 NOTE — PROGRESS NOTES
Pulmonary Clinic Outpatient Consultation    Assessment and Plan:   Anupama Miranda is a 36 y.o. female never smoker with a history of exercise-induced asthma, seasonal allergies, presenting for evaluation after recent first episode of primary spontaneous right-sided pneumothorax.    Primary spontaneous right-sided pneumothorax: First episode, small, presented to ED on 5/17 and followed up on 5/18.  CXR today shows that the PTX is significantly smaller.  Has some occasional discomfort in the chest, exertional dyspnea, exhaustion.  With the resolving PTX, right pleural pigtail catheter placement and hospital admission is not clearly indicated.  DDx for this includes catamenial pneumothorax, as it occurred 2 days prior to onset of menses (patient has also had reported perimenstrual abdominal pain, and endometriosis would be a risk factor for catamenial pneumothorax), possible apical blebs.  At this point, there is no clear indication for CT imaging.    Plan:  - repeat CXR in one month with clinic follow-up at that time  - if persistent symptoms despite PTX resolution, consider PFTs and chest CT  - if PTX fails to resolve or worsens, consider pleural pigtail placement vs thoracoscopic pleurodesis  - albuterol as needed for exercise-induced asthma  - advised against Valsalva/straining, including lifting more than 10 pounds  - advised her to seek immediate medical attention if worsening dyspnea, chest pain or pressure  - encouraged her to call any time with questions or concerning symptoms    I appreciate the opportunity to participate in the care of Ms. Miranda.  Please feel free to contact me at any time.    CCx: right pneumothorax    HPI: Anupama Miranda is a 36 y.o. female never smoker with a history of exercise-induced asthma, seasonal allergies, presenting for evaluation after recent first episode of primary spontaneous right-sided pneumothorax.  Presented to ED on 5/17 with acute dyspnea, found to have small right  apical PTX.  Occurred 2 days prior to onset of menses.  No prior history of this.  Given small size and clinical stability, discharged with planned ED f/u next day, when the PTX remained stable/decreased.  Subsequent CXR on  showed persistence, though the right basilar component had resolved, and CXR today shows further improvement.  She has a h/o exercise-induced asthma that has been fairly stable, and uses albuterol HFA occasionally.  Notes h/o abdominal pain worse around menses, and notes issues with attempting to have children, though did not go into details about this.    ROS:  A 12-system review was obtained and was negative with the exception of the symptoms endorsed in the history of present illness.    PMH:  Exercise-induced asthma  Seasonal allergies    PSH:  Past Surgical History:   Procedure Laterality Date     DILATION AND CURETTAGE OF UTERUS       LASIK         Allergies:  Allergies   Allergen Reactions     Ambien [Zolpidem] Other (See Comments)     Hallucinations.       Family HX:  Family History   Problem Relation Age of Onset     Other Sister      cardiac ablation     Breast cancer Maternal Grandmother 69     Stroke Maternal Grandmother      Skin cancer Maternal Grandmother      basal cell carcinomas     Hypertension Maternal Grandfather      Coronary artery disease Maternal Grandfather      Prostate cancer Maternal Grandfather      diagnosed in his 60s     Lung disease Maternal Grandfather      unspecified, perhaps related to exposures working as a      Diabetes Paternal Grandmother      Stomach cancer Paternal Grandmother 80     Alcohol abuse Paternal Aunt      Breast cancer Maternal Aunt 55     grade 2 IDC, ER/DC+, HER2-     Diabetes Paternal Uncle       in his 40s     Skin cancer Maternal Aunt      basal cell carcinoma in her 50s     Colon cancer Other      great-grandfather (maternal grandfather's father), diagnosed in his 60s     Breast cancer Other 80     great-aunt  (maternal grandmother's sister)     Skin cancer Other      great-grandfather (maternal grandmother's father), on his lip, smoker     Lung cancer Other 60     great-aunt (maternal grandmother's sister), smoker     Cancer Other      great-grandmother (maternal grandmother's mother), bone cancer in her 80s     Cancer Other 75     great-uncle (maternal grandmother's brother), bile duct cancer       Social Hx:  Social History     Social History     Marital status:      Spouse name: N/A     Number of children: N/A     Years of education: N/A     Occupational History     Not on file.     Social History Main Topics     Smoking status: Never Smoker     Smokeless tobacco: Not on file     Alcohol use Not on file     Drug use: Not on file     Sexual activity: Not on file     Other Topics Concern     Not on file     Social History Narrative       Current Meds:  Current Outpatient Prescriptions   Medication Sig Dispense Refill     albuterol (PROAIR HFA;PROVENTIL HFA;VENTOLIN HFA) 90 mcg/actuation inhaler Inhale 2 puffs every 6 (six) hours as needed for wheezing. 1 Inhaler 1     azelaic acid (FINACEA) 15 % gel Apply 1 application topically every other day. After skin is thoroughly washed and patted dry, gently but thoroughly massage a thin film of azelaic acid cream into the affected area twice daily, in the morning and evening.       prenatal vitamin iron-folic acid 27mg-0.8mg (PRENATAL S) 27 mg iron- 800 mcg Tab tablet Take 1 tablet by mouth daily.       tretinoin (RETIN-A) 0.05 % cream Apply 1 application topically at bedtime as needed.        No current facility-administered medications for this visit.        Imaging studies:  Multiple CXRs from 5/17/17 to 5/26/17 directly reviewed:  - small right PTX has slowly improved, with right basilar component no longer apparent, not entirely resolved    Physical Exam:  /62  Pulse (!) 57  Resp 16  Wt 160 lb (72.6 kg)  LMP 04/21/2017 (Exact Date)  SpO2 100% Comment: RUTHIE   BMI 22.32 kg/m2  Gen: alert, oriented, no distress  HEENT: nasal turbinates are unremarkable, no oropharyngeal lesions, no cervical or supraclavicular lymphadenopathy  CV: RRR, no M/G/R  Resp: CTAB, no focal crackles or wheezes  Abd: soft, nontender, no palpable organomegaly  Skin: no apparent rashes  Ext: no cyanosis, clubbing or edema  Neuro: alert, nonfocal    Basilio Mckinnon MD  [facility]  Cell [phone]  Office [phone]  Pager [phone]

## 2021-06-10 NOTE — PROGRESS NOTES
Subjective:    Anupama Miranda is a 36 y.o. female who presents for follow-up right spontaneous pneumothorax.  She feels like everything started last Tuesday, 5/16/2017.  Initially she had some chest pain and trouble breathing.  She first attributed this to stress or panic attack.  Then she thought perhaps she was having an asthma flare.  She took 2 nebs at school.  Nebs did seem to help symptoms slightly.  She says she kept feeling like she was having an anxiety attack.  Symptoms did not really improve, so she came here to see Dr. Marx on 5/17/2017.  I reviewed his note today.  He thought it was possible she may have a PE, she was sent to the emergency room.  I reviewed both ER notes today.  She was diagnosed with spontaneous right pneumothorax.  This is seen on x-ray.  She was kept 7-8 hours in the ER for monitoring and pneumothorax did not appear to worsen with follow-up x-ray.  She was then sent home overnight, and returned to the ER the next day, as requested, for follow-up.  Follow-up x-ray showed pneumothorax remained stable.  Since she was stable, and feeling ok, she was discharged home and asked to follow-up with PCP within a few days.  ER also recommended she follow-up with pulmonology.  Today, patient feels like her symptoms are improved, but still present.  She does sometimes get short of breath, sometimes has chest/epigastric pain.  She does not talk in her normal voice, because talking at a normal volume makes it a little harder to breathe.  She did not get short of breath walking from the lobby to our exam room.  She is able to do all of her ADLs at home without a lot of trouble, though sometimes she does have to stop and rest or take a breath.  She does continue to have some mid abdominal pain, just above the umbilicus.  Shortness of breath seems worse if she is bending over.    No family history of lung problems.  She is a non-smoker.  No other obvious triggers.  No current prescription  medications, other than for acne.  She notes her sister had some type of cardiac ablation.  No known family history of Marfan's Syndrome.    Patient Active Problem List   Diagnosis     Intermittent asthma     Infertility     Acne     Seasonal allergies       Current Outpatient Prescriptions:      albuterol (PROAIR HFA;PROVENTIL HFA;VENTOLIN HFA) 90 mcg/actuation inhaler, Inhale 2 puffs every 6 (six) hours as needed for wheezing., Disp: 1 Inhaler, Rfl: 1     azelaic acid (FINACEA) 15 % gel, Apply 1 application topically every other day. After skin is thoroughly washed and patted dry, gently but thoroughly massage a thin film of azelaic acid cream into the affected area twice daily, in the morning and evening., Disp: , Rfl:      prenatal vitamin iron-folic acid 27mg-0.8mg (PRENATAL S) 27 mg iron- 800 mcg Tab tablet, Take 1 tablet by mouth daily., Disp: , Rfl:      tretinoin (RETIN-A) 0.05 % cream, Apply 1 application topically at bedtime as needed. , Disp: , Rfl:      Objective:   Allergies:  Ambien [zolpidem]    Vitals:  Vitals:    05/22/17 1303   BP: 114/70   Pulse: (!) 56   SpO2: 100%     Body mass index is 22.33 kg/(m^2).    Vital signs reviewed.  General: Patient is alert and oriented x 3, in no apparent distress, in no apparent distress, patient talks in a voice that is quieter than normal  Cardiac: regular rate and rhythm, no murmurs  Pulmonary: lungs clear to auscultation bilaterally, no crackles, rales, rhonchi, or wheezing noted  Musculoskeletal: Negative Marfan's wrist and thumb signs.  My CMA did not measure arm span today before she left.  Patient reports her bilateral arm span is approximately 6 feet.  Her height is 5'11.    Results for orders placed or performed during the hospital encounter of 05/17/17   Basic Metabolic Panel   Result Value Ref Range    Sodium 142 136 - 145 mmol/L    Potassium 3.8 3.5 - 5.0 mmol/L    Chloride 108 (H) 98 - 107 mmol/L    CO2 23 22 - 31 mmol/L    Anion Gap, Calculation 11  5 - 18 mmol/L    Glucose 88 70 - 125 mg/dL    Calcium 9.1 8.5 - 10.5 mg/dL    BUN 12 8 - 22 mg/dL    Creatinine 0.89 0.60 - 1.10 mg/dL    GFR MDRD Af Amer >60 >60 mL/min/1.73m2    GFR MDRD Non Af Amer >60 >60 mL/min/1.73m2   HM2(CBC w/o Differential)   Result Value Ref Range    WBC 6.8 4.0 - 11.0 thou/uL    RBC 4.28 3.80 - 5.40 mill/uL    Hemoglobin 13.6 12.0 - 16.0 g/dL    Hematocrit 41.1 35.0 - 47.0 %    MCV 96 80 - 100 fL    MCH 31.8 27.0 - 34.0 pg    MCHC 33.1 32.0 - 36.0 g/dL    RDW 11.7 11.0 - 14.5 %    Platelets 211 140 - 440 thou/uL    MPV 11.0 8.5 - 12.5 fL   D-dimer, Quantitative   Result Value Ref Range    D-Dimer, Quant <0.27 <=0.50 FEU ug/mL   Troponin I   Result Value Ref Range    Troponin I 0.01 0.00 - 0.29 ng/mL   BNP(B-type Natriuretic Peptide)   Result Value Ref Range    BNP <10 0 - 64 pg/mL   ECG 12 lead nursing unit performed   Result Value Ref Range    SYSTOLIC BLOOD PRESSURE  mmHg    DIASTOLIC BLOOD PRESSURE  mmHg    VENTRICULAR RATE 62 BPM    ATRIAL RATE 62 BPM    P-R INTERVAL 166 ms    QRS DURATION 90 ms    Q-T INTERVAL 446 ms    QTC CALCULATION (BEZET) 452 ms    P Axis 56 degrees    R AXIS 35 degrees    T AXIS 55 degrees    MUSE DIAGNOSIS       Normal sinus rhythm  Normal ECG  No previous ECGs available  Confirmed by ELSA THOMPSON MD LOC:[facility] ([id number]) on 5/18/2017 11:59:17 AM       I personally reviewed chest x-ray from today, and compared with previous chest x-rays from ER.  XR CHEST PA AND LATERAL  5/22/2017 1:56 PM   INDICATION: Pneumothorax.  COMPARISON: 5/18/2017   FINDINGS: Small right pneumothorax has not appreciably changed in size. The pneumothorax measures 3.5 cm at the apex. Previously seen subpleural air has resolved. The lungs remain clear.   This report was electronically interpreted by: Dr. Gerber Gonzalez MD ON 05/22/2017 at 14:19    Assessment and Plan:   1.  Follow-up spontaneous right pneumothorax.  Patient is stable right now.  Oxygen level is 100% on room air at rest.  She  still feels a little short of breath at times.  It's hard for her to talk with her normal volume level.  Follow-up x-ray today shows pneumothorax is stable.  Patient referred to pulmonology for follow-up.  It looks like the earliest they can get her in is this Friday.  I reviewed with her, in detail, reasons that she should contact the clinic prior to then.  I discussed she should not be doing any significant exercise or strenuous activity until she sees pulmonology.  She thinks she may be well enough to go back to work tomorrow.  She works as a teacher.    This dictation uses voice recognition software, which may contain typographical errors.

## 2021-06-11 NOTE — PROGRESS NOTES
Pulmonary Clinic Follow-up Visit    Assessment and Plan:   Anupama Miranda is a 36 y.o. female never smoker with a history of exercise-induced asthma, seasonal allergies, presenting for follow-up of first episode of primary spontaneous right-sided pneumothorax.     Primary spontaneous right-sided pneumothorax, mild exercise-induced asthma: Resolved on chest X-ray today.  Idiopathic, though DDx includes catamenial pneumothorax, as it occurred 2 days prior to onset of menses (patient has also had reported perimenstrual abdominal pain, and endometriosis would be a risk factor for catamenial pneumothorax).  Given spontaneous resolution, no further surveillance is indicated and she is cleared to pursue regular exercise/activity.  Counseled her to seek medical attention if recurrent dyspnea, chest tightness, chest pain.  If it recurs, would need thoracoscopy for diagnostic exam and pleurodesis.  Has minimal exercise-induced asthma with infrequent need for albuterol.    Plan:  - no further surveillance imaging  - regular exercise/activity  - advised her to seek medical attention for any recurrent dyspnea, chest pain, chest pressure  - if recurrent would need thoracoscopy for pleural exam and pleurodesis, possibly preceded by chest CT to evaluate for apical blebs  - albuterol as needed for mild exercise-induced asthma  - encouraged her to call any time with questions or concerning symptoms    CCx: right pneumothorax, mild exercise-induced asthma    HPI: Anupama Miranda is a 36 y.o. female never smoker with a history of exercise-induced asthma, seasonal allergies, presenting for follow-up of first episode of primary spontaneous right-sided pneumothorax.  Was managed conservatively given small size.  Since evaluation at the end of May, has been stable.  Felt winded once after running outside in the heat, but otherwise no chest symptoms, infrequent albuterol need.  CXR today shows resolution of right PTX.    ROS:  A  12-system review was obtained and was negative with the exception of the symptoms endorsed in the history of present illness.    PMH:  Exercise-induced asthma  Seasonal allergies  One-time episode right-sided primary spontaneous PTX, managed conservatively    PSH:  Past Surgical History:   Procedure Laterality Date     DILATION AND CURETTAGE OF UTERUS       LASIK         Allergies:  Allergies   Allergen Reactions     Ambien [Zolpidem] Other (See Comments)     Hallucinations.       Family HX:  Family History   Problem Relation Age of Onset     Other Sister      cardiac ablation     Breast cancer Maternal Grandmother 69     Stroke Maternal Grandmother      Skin cancer Maternal Grandmother      basal cell carcinomas     Hypertension Maternal Grandfather      Coronary artery disease Maternal Grandfather      Prostate cancer Maternal Grandfather      diagnosed in his 60s     Lung disease Maternal Grandfather      unspecified, perhaps related to exposures working as a      Diabetes Paternal Grandmother      Stomach cancer Paternal Grandmother 80     Alcohol abuse Paternal Aunt      Breast cancer Maternal Aunt 55     grade 2 IDC, ER/MO+, HER2-     Diabetes Paternal Uncle       in his 40s     Skin cancer Maternal Aunt      basal cell carcinoma in her 50s     Colon cancer Other      great-grandfather (maternal grandfather's father), diagnosed in his 60s     Breast cancer Other 80     great-aunt (maternal grandmother's sister)     Skin cancer Other      great-grandfather (maternal grandmother's father), on his lip, smoker     Lung cancer Other 60     great-aunt (maternal grandmother's sister), smoker     Cancer Other      great-grandmother (maternal grandmother's mother), bone cancer in her 80s     Cancer Other 75     great-uncle (maternal grandmother's brother), bile duct cancer       Social Hx:  Social History     Social History     Marital status:      Spouse name: N/A     Number of children:  N/A     Years of education: N/A     Occupational History     Not on file.     Social History Main Topics     Smoking status: Never Smoker     Smokeless tobacco: Not on file     Alcohol use Not on file     Drug use: Not on file     Sexual activity: Not on file     Other Topics Concern     Not on file     Social History Narrative       Current Meds:  Current Outpatient Prescriptions   Medication Sig Dispense Refill     albuterol (PROAIR HFA;PROVENTIL HFA;VENTOLIN HFA) 90 mcg/actuation inhaler Inhale 2 puffs every 6 (six) hours as needed for wheezing. 1 Inhaler 1     azelaic acid (FINACEA) 15 % gel Apply 1 application topically every other day. After skin is thoroughly washed and patted dry, gently but thoroughly massage a thin film of azelaic acid cream into the affected area twice daily, in the morning and evening.       prenatal vitamin iron-folic acid 27mg-0.8mg (PRENATAL S) 27 mg iron- 800 mcg Tab tablet Take 1 tablet by mouth daily.       tretinoin (RETIN-A) 0.05 % cream Apply 1 application topically at bedtime as needed.        No current facility-administered medications for this visit.        Physical Exam:  /70  Pulse (!) 56  Resp 20  Wt 160 lb 4.8 oz (72.7 kg)  SpO2 100% Comment: RA  BMI 22.36 kg/m2  Gen: alert, oriented, no distress  HEENT: no oropharyngeal lesions  CV: RRR, no M/G/R  Resp: CTAB, no focal crackles or wheezes  Abd: soft, nontender, no palpable organomegaly  Skin: no apparent rashes  Ext: no cyanosis, clubbing or edema  Neuro: alert, nonfocal    Imaging studies:  CXR PA/lateral (6/19/17):  images directly reviewed, formal interpretation follows:  FINDINGS: Lungs are clear. Tiny right apical pneumothorax is not identified. Heart and pulmonary vascularity are normal. No effusions.    Basilio Mckinnon MD  Pulmonary and Critical Care Medicine  [facility]  Cell [phone]  Office [phone]  Pager [phone]

## 2021-06-12 NOTE — ANESTHESIA PREPROCEDURE EVALUATION
Anesthesia Evaluation      Patient summary reviewed   No history of anesthetic complications     Airway   Mallampati: II  Neck ROM: full   Pulmonary    (+) asthma  decreased breath sounds (decreased right side),   (-) not a smoker    ROS comment: Spontaneous pneumothorax                         Cardiovascular - negative ROS  (-) murmur  Rhythm: regular  Rate: normal,    no murmur      Neuro/Psych - negative ROS     Endo/Other - negative ROS      Comments: Infertility    GI/Hepatic/Renal - negative ROS      Other findings: Recurrent pneumothorax, sp thoracotomy 8/11      Dental - normal exam                 Chemistry        Component Value Date/Time     08/21/2017 1735    K 4.2 08/21/2017 1735     08/21/2017 1735    CO2 27 08/21/2017 1735    BUN 13 08/21/2017 1735    CREATININE 0.94 08/21/2017 1735     08/21/2017 1735        Component Value Date/Time    CALCIUM 10.2 08/21/2017 1735        Lab Results   Component Value Date    WBC 9.7 08/21/2017    HGB 14.6 08/21/2017    HCT 43.0 08/21/2017    MCV 94 08/21/2017     08/21/2017                  Anesthesia Plan  Planned anesthetic: general endotracheal and epidural  DL ETT with one lung ventilation  Decompression of the chest before ventilation  ASA 2   Induction: intravenous   Anesthetic plan and risks discussed with: patient and spouse  Anesthesia plan special considerations: antiemetics,   Post-op plan: epidural analgesia and routine recovery

## 2021-06-12 NOTE — ANESTHESIA PREPROCEDURE EVALUATION
Anesthesia Evaluation      Patient summary reviewed   No history of anesthetic complications     Airway   Mallampati: II  Neck ROM: full   Pulmonary - normal exam    breath sounds clear to auscultation  (+) asthma  mild,  well controlled,   (-) not a smoker    ROS comment: Spontaneous pneumothorax                         Cardiovascular - negative ROS  (-) murmur  Rhythm: regular  Rate: normal,    no murmur      Neuro/Psych - negative ROS     Endo/Other - negative ROS      Comments: Infertility    GI/Hepatic/Renal - negative ROS      Other findings: Recurrent pneumothorax, INR-pending      Dental - normal exam                        Anesthesia Plan  Planned anesthetic: general endotracheal and epidural  DL ETT with one lung ventilation  ASA 2   Induction: intravenous   Anesthetic plan and risks discussed with: patient and spouse  Anesthesia plan special considerations: antiemetics,   Post-op plan: epidural analgesia and routine recovery

## 2021-06-12 NOTE — PROGRESS NOTES
"Spoke with Anupama this am.  Stated that she saw her PCP yesterday and had a CXR done which her PCP said looked \"ok\".  Would like Dr. Mckinnon to review as well.    She is seeing Dr. Hunter today and will have those notes sent to Dr. Mckinnon to keep him \"in the loop\".  Still deciding if she will schedule f/u appointment with Dr. Mckinnon or not.  "

## 2021-06-12 NOTE — ANESTHESIA POST-OP FOLLOW-UP NOTE
Patient reports mild pain. Pain worsens with cough and movement. She is on a Dilaudid PCA for supplemental pain control. She had 3 failed demand doses last night but appears to be doing better at the moment.     The epidural is infusing at  8 cc/hr.    The site is clear and  dry and the dressing is intact.    Will continue at  infusion rate of 8 cc/hr.

## 2021-06-12 NOTE — ANESTHESIA POSTPROCEDURE EVALUATION
Patient: Anupama NGUYEN Stock  RIGHT THORACOSCOPY WITH WEDGE RESECTION OF RIGHT UPPER LOBE  Anesthesia type: No value filed.    Patient location: [facility] Surgical Floor  Last vitals:   Vitals:    08/14/17 1159   BP: 109/72   Pulse: (!) 58   Resp: 16   Temp: 36.7  C (98  F)   SpO2: 100%       Additional Notes: Chest tube removed. Pain well controlled. VSS. Epidural removed, blue tip intact. Site looks good. No follow up is necessary.

## 2021-06-12 NOTE — ANESTHESIA POSTPROCEDURE EVALUATION
Called to PACU bedside with patient complaining of right sided pain.  On level exam, patient has a level to cold from T3 to T10 on left, no significant level on right.  Plan to pull back epidural from 12 cm to 10 cm, rebolus with 1/4% bupivacaine, and re-assess.

## 2021-06-12 NOTE — ANESTHESIA PROCEDURE NOTES
Epidural Block    Patient location during procedure: pre-op  Time Called: 8/22/2017 7:15 AM  Reason for Block:post-op pain management and at surgeon's request  Staffing:  Performing  Anesthesiologist: RAMÓN OVALLES  Preanesthetic Checklist  Completed: patient identified, risks, benefits, and alternatives discussed, timeout performed, consent obtained, at patient's request, airway assessed, oxygen available, suction available, emergency drugs available and hand hygiene performed  Procedure  Patient position: sitting  Prep: ChloraPrep  Patient monitoring: continuous pulse oximetry, heart rate and blood pressure  Approach: midline  Epidural location: T8-T10.  Injection technique: DEEP saline    Needle  Needle type: Jaylan   Needle gauge: 18 G     Catheter in Space: 5  Assessment  Sensory level:  No complications

## 2021-06-12 NOTE — ANESTHESIA CARE TRANSFER NOTE
Last vitals:   Vitals:    08/22/17 1028   BP: 94/53   Pulse: (!) 56   Resp: 18   Temp: 36.2  C (97.2  F)   SpO2: 100%     Patient's level of consciousness is drowsy  Spontaneous respirations: yes  Maintains airway independently: yes  Dentition unchanged: yes  Oropharynx: oropharynx clear of all foreign objects    QCDR Measures:  ASA# 20 - Surgical Safety Checklist: WHO surgical safety checklist completed prior to induction  PQRS# 430 - Adult PONV Prevention: 4558F - Pt received => 2 anti-emetic agents (different classes) preop & intraop  ASA# 8 - Peds PONV Prevention: NA - Not pediatric patient, not GA or 2 or more risk factors NOT present  PQRS# 424 - Grace-op Temp Management: 4559F - At least one body temp DOCUMENTED => 35.5C or 95.9F within required timeframe  PQRS# 426 - PACU Transfer Protocol: - Transfer of care checklist used  ASA# 14 - Acute Post-op Pain: ASA14B - Patient did NOT experience pain >= 7 out of 10

## 2021-06-12 NOTE — ANESTHESIA POST-OP FOLLOW-UP NOTE
Patient: Anupama Miranda  Pt seen in f/u s/p epidural catheter placement for post-op pain control on 8/22. Epidural site CDI. Pain well controlled at this time with decreased numbness of right leg and buttocks. Pt. Sitting at the side of the bed. Epidural catheter pulled with blue tip intact. Pt. Concerned about increased pain, but understands the need to pull the catheter on post-op day 5. I assured her that we could reinsert the catheter if CT pain was intolerable, but I suspect that the PCA will cover the pain at this stage of her recovery. Will f/u tomorrow to verify resolution of right leg numbness.

## 2021-06-12 NOTE — ANESTHESIA POST-OP FOLLOW-UP NOTE
Patient: Anupama NGUYEN Ruben  RIGHT THORACOSCOPY, RIGHT PARIETAL PLEURECTOMY, PLEURAL ABRAISION.  Anesthesia type: general    5/10 resting, 8/10 ambulating  Good IS using IV PCA    Walking around unit (2x) yesterday  Will continue to follow.  No further questions.    SK

## 2021-06-12 NOTE — ANESTHESIA POSTPROCEDURE EVALUATION
Patient: Anupama Miranda  RIGHT THORACOSCOPY WITH WEDGE RESECTION OF RIGHT UPPER LOBE  Anesthesia type: No value filed.    Patient location: PACU  Last vitals:   Vitals:    08/11/17 1345   BP: (!) 85/50   Pulse: (!) 43   Resp: 18   Temp:    SpO2: 100%     Post vital signs: stable  Level of consciousness: awake and responds to simple questions  Post-anesthesia pain: pain controlled  Post-anesthesia nausea and vomiting: no  Pulmonary: unassisted, return to baseline  Cardiovascular: stable and blood pressure at baseline  Hydration: adequate  Anesthetic events: no    QCDR Measures:  ASA# 11 - Grace-op Cardiac Arrest: ASA11B - Patient did NOT experience unanticipated cardiac arrest  ASA# 12 - Grace-op Mortality Rate: ASA12B - Patient did NOT die  ASA# 13 - PACU Re-Intubation Rate: ASA13B - Patient did NOT require a new airway mgmt  ASA# 10 - Composite Anes Safety: ASA10A - No serious adverse event  ASA# 38 - New Corneal Injury: ASA38A - No new exposure keratitis or corneal abrasion in PACU    Additional Notes: RN starting epidural infusion.  cc's to be given for BP of 85 systolic.

## 2021-06-12 NOTE — PROGRESS NOTES
Subjective:    Anupama Miranda is a 37 y.o. female who presents for evaluation of hospital and ER follow-up for pleurectomy s/p recurrent right spontaneous pneumothorax.  This is patient's third recurrence of pneumothorax, no clearly identified trigger.  I saw her on 8/21/2017 as a hospital follow-up after her first surgery.  Please see that note for details.  At that visit, she had actually felt like her breathing was worse.  Chest x-ray showed almost completely collapsed right lung.  I had her return to the ER, and she was admitted for ongoing care.  She was at the hospital from 8/21/17 through 8/28/17.  I reviewed the hospital discharge summary today.  She had a right pleurectomy and abrasion.  After hospital discharge, she returned to the ER the next day, 8/29/2017, for wound check issues.  She says she and her  were not given appropriate follow-up instructions for wound care.  ER doctor felt like wounds were healing within normal limits, gave them more supplies, and reviewed care instructions.  Chest x-ray at that visit showed a small area of air at the right lung base, stable or decreased as compared to discharge.  Case was reviewed with pulmonology, and patient was sent home to monitor.  She also spoke with pulmonologist over the phone on 9/3/2017, note reviewed today.  Patient complained primarily of chest tightness, as she is doing today.  Pulmonologist wanted her to have a follow-up x-ray with me today.    She is meeting with her surgeon tomorrow.  She continues to have some chest tightness, which does sometimes make breathing more difficult for her.  No new symptoms.  She feels like her wounds are now healing pretty well.  She is surprised that she is not recovering as fast as she expected.  She feels like she has very poor stamina.  She gets out of breath easily.  It takes her several hours to get ready in the morning, because she has to frequently stop and rest in between dressing, taking a  shower, etc.  She works as a teacher.  She has been unable to return to work since her surgery.    Patient Active Problem List   Diagnosis     Intermittent asthma, uncomplicated     Infertility     Acne     Seasonal allergies     Spontaneous pneumothorax     Asthma, exercise induced     Primary spontaneous pneumothorax     Pneumothorax       Current Outpatient Prescriptions:      ACETAMINOPHEN ORAL, Take 1,000 mg by mouth 4 (four) times a day as needed. , Disp: , Rfl:      albuterol (PROAIR HFA;PROVENTIL HFA;VENTOLIN HFA) 90 mcg/actuation inhaler, Inhale 2 puffs every 6 (six) hours as needed for wheezing., Disp: 1 Inhaler, Rfl: 1     azelaic acid (FINACEA) 15 % gel, Gently but thoroughly massage a thin film of azelaic acid cream into the affected area twice daily, Disp: 30 g, Rfl: 0     senna-docusate (PERICOLACE) 8.6-50 mg tablet, Take 1 tablet by mouth 2 (two) times a day., Disp: , Rfl: 0     tretinoin (RETIN-A) 0.05 % cream, Apply 1 application topically at bedtime as needed. , Disp: , Rfl:      acetaminophen-codeine (TYLENOL #2) 300-15 mg per tablet, Take 1 tablet by mouth every 6 (six) hours as needed for pain., Disp: 15 tablet, Rfl: 0     polyethylene glycol (MIRALAX) 17 gram packet, Take 1 packet (17 g total) by mouth daily., Disp: , Rfl: 0     Objective:   Allergies:  Ambien [zolpidem]    Vitals:  Vitals:    09/05/17 1403 09/05/17 1441   BP: 100/60    Patient Site: Right Arm    Patient Position: Sitting    Cuff Size: Adult Regular    Pulse: 76    Resp: 20    SpO2:  100%   Weight: 153 lb (69.4 kg)      Body mass index is 21.34 kg/(m^2).    Vital signs reviewed.  General: Patient is alert and oriented x 3, in no apparent distress appropriately groomed with normal affect  Cardiac: regular rate and rhythm, no murmurs  Pulmonary: lungs clear to auscultation bilaterally, no crackles, rales, rhonchi, or wheezing noted    I reviewed chest x-ray today.  I also reviewed this with patient and her family.  I compared  her current chest x-ray to x-ray done at my last visit with her, and at ER on 8/29/2017.  XR CHEST PA AND LATERAL  9/5/2017 2:51 PM   INDICATION: recurrent right pneumothorax  COMPARISON: 08/21/2017, 08/10/2017   FINDINGS: Postthoracotomy changes are noted with a new staple line in the right apex medially. There is a tiny pleural line in the right apex laterally but lung can be seen superior to it. Elevation of the right hemidiaphragm with pleural thickening in the right lateral costophrenic angle. Left lung is clear. Heart and pulmonary vascularity are normal.   This report was electronically interpreted by: Dr. Martita Oneill MD ON 09/05/2017 at 15:37    Assessment and Plan:   1.  Hospital discharge and ER follow-up for right pleurectomy, status post recurrent right spontaneous pneumothorax.  Hospital discharge and recent ER visit were reviewed.  I reviewed 2 previous chest x-rays, both films and their reports.  X-ray today shows no significant change as compared to x-ray done at the ER 1 week ago.  Patient continues with chest tightness, but otherwise is feeling better.  No new symptoms.  She has an appointment with her pulmonary surgeon tomorrow.  I excused her from work for this week.  We may need to extend that, depending on how her stamina improves over this week.  She will continue to work on increasing her endurance.    This dictation uses voice recognition software, which may contain typographical errors.

## 2021-06-12 NOTE — ANESTHESIA PROCEDURE NOTES
Epidural Block    Patient location during procedure: pre-op    Reason for Block:at surgeon's request and post-op pain management  Staffing:  Performing  Anesthesiologist: YUE FLORES  Preanesthetic Checklist  Completed: patient identified, risks, benefits, and alternatives discussed, timeout performed, consent obtained, airway assessed, oxygen available, suction available, emergency drugs available and hand hygiene performed  Procedure  Patient position: sitting  Prep: Betadine, ChloraPrep and site prepped and draped  Patient monitoring: continuous pulse oximetry, heart rate and blood pressure  Approach: midline  Epidural location: T9-10.  Injection technique: DEEP saline  Number of Attempts:1  Needle  Needle type: Jaylan   Needle gauge: 18 G     Catheter in Space: 6

## 2021-06-12 NOTE — ANESTHESIA POST-OP FOLLOW-UP NOTE
Patient: Anupama Miranda  RIGHT THORACOSCOPY, RIGHT PARIETAL PLEURECTOMY, PLEURAL ABRAISION.  Anesthesia type: general    S:  Patient overall feeling ok.  She does believe her epidural catheter is working as she has no chest wall or incisional pain whatsoever.  The majority of her pain is in her upper shoulder area and scapula, c/w chest tube pain.  She is otherwise feeling ok.  Still taking the PCA for coverage.    O:   Vitals:    08/23/17 0500   BP: 105/72   Pulse: 66   Resp: 16   Temp: 36.7  C (98.1  F)   SpO2: 99%     Back:  Thoracic epidural site is c/d/i  Patient has some light touch sensation deficit of the right upper thigh but motor remains intact    A/P:  Ms. Miranda is a 37 yo F with a history of recurrent pneumothorax POD 1 s/p right parietal pleurectomy.  She remains in oxygen and PCA as well as her thoracic epidural rate at 10 mL/hr 0.125% bupivacaine.  She is doing well overall.  Plan for continuing the epidural throughout the day.  We can decrease the rate if motor weakness or hypotension become issues.  We are happy to be of assistance 24/7 @ 5-2650.    We will plan on following Ms. Miranda for as long as she needs regional anesthetic for analgesia.

## 2021-06-12 NOTE — ANESTHESIA CARE TRANSFER NOTE
Last vitals:   Vitals:    08/11/17 1317   BP: (!) 87/52   Pulse: (!) 50   Resp: 16   Temp: 36.2  C (97.2  F)   SpO2: 100%     Patient's level of consciousness is drowsy  Spontaneous respirations: yes  Maintains airway independently: yes  Dentition unchanged: yes  Oropharynx: oropharynx clear of all foreign objects    QCDR Measures:  ASA# 20 - Surgical Safety Checklist: WHO surgical safety checklist completed prior to induction  PQRS# 430 - Adult PONV Prevention: 4558F - Pt received => 2 anti-emetic agents (different classes) preop & intraop  ASA# 8 - Peds PONV Prevention: NA - Not pediatric patient, not GA or 2 or more risk factors NOT present  PQRS# 424 - Grace-op Temp Management: 4559F - At least one body temp DOCUMENTED => 35.5C or 95.9F within required timeframe  PQRS# 426 - PACU Transfer Protocol: - Transfer of care checklist used  ASA# 14 - Acute Post-op Pain: ASA14B - Patient did NOT experience pain >= 7 out of 10

## 2021-06-12 NOTE — ANESTHESIA POST-OP FOLLOW-UP NOTE
Patient: Anupama Miranda  RIGHT THORACOSCOPY, RIGHT PARIETAL PLEURECTOMY, PLEURAL ABRAISION.  Anesthesia type: general    Called to evaluate for right leg weakness. Epidural continues at 10ml/hr, bupivacaine only.     On exam, epidural site clean and intact, without erythema at insertion site. Patient able to move both legs, but some mild right sided weakness.     Patient reports that the right sided weakness and hip/buttocks numbness has been constant since initiation of epidural, and has not changed in quality. She has been up out of bed, but does notice favoring her right leg due to this weakness. I suggested that we pull the epidural given her symptoms of weakness and that the epidural has been in since 8/22.  She is quite concerned about her level of pain control, and continue to need PCA narcotics, so she really would prefer to keep the epidural in place even with the mild weakness.     Will continue epidural overnight, decrease dose to 8ml/hr, and reevaluate in AM.

## 2021-06-12 NOTE — PROGRESS NOTES
Assessment/plan  1. Spontaneous pneumothorax  - XR Chest PA and Lateral  2. Cough  3. Intermittent asthma  4. Seasonal allergies  5. Acne  Reviewed imaging results with patient.   Reviewed EHR and pulmonary evaluation for need for pleurodesis if recurrent.   Discussed case with Dr. Rider who advised evaluation and treatment in ED now.   Vital signs are stable. Pain is controlled.   She will speak to her  and proceed to [facility] ED.   [facility] ED notified.   Acne medication refilled.   We discussed treatment of seasonal allergies.   She agrees with the above management and plan.           Subjective  [age] year old female complains of cough, chest pressure for one day. She is worried about another pneumothorax. This happened in May. She was treated by pulmonary. Though to be idiopathic. She is non smoker. She denies fever, productive cough, recent injury. She is currently menstruating which was the same onset as last time.   She took albuterol yesterday to see if it would help, it did not.   Her allergies are worse right now. Has nasal congestion. Takes loratadine one and a while.   Has history of exercise induced asthma. Takes albuterol as needed only.  She would like a refill of her acne medication.   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 She is not aware of any Tdap. She would like to update this today.       Past Medical History:   Diagnosis Date     Acne      Arthritis      Infertility, female      Intermittent asthma      Miscarriage      Seasonal allergies      Patient Active Problem List   Diagnosis     Intermittent asthma     Infertility     Acne     Seasonal allergies     Spontaneous pneumothorax     Past Surgical History:   Procedure Laterality Date     DILATION AND CURETTAGE OF UTERUS       LASIK       Family History   Problem Relation Age of Onset     Other Sister      cardiac ablation     Breast cancer Maternal Grandmother 69     Stroke Maternal Grandmother       Skin cancer Maternal Grandmother      basal cell carcinomas     Hypertension Maternal Grandfather      Coronary artery disease Maternal Grandfather      Prostate cancer Maternal Grandfather      diagnosed in his 60s     Lung disease Maternal Grandfather      unspecified, perhaps related to exposures working as a      Diabetes Paternal Grandmother      Stomach cancer Paternal Grandmother 80     Alcohol abuse Paternal Aunt      Breast cancer Maternal Aunt 55     grade 2 IDC, ER/OR+, HER2-     Diabetes Paternal Uncle       in his 40s     Skin cancer Maternal Aunt      basal cell carcinoma in her 50s     Colon cancer Other      great-grandfather (maternal grandfather's father), diagnosed in his 60s     Breast cancer Other 80     great-aunt (maternal grandmother's sister)     Skin cancer Other      great-grandfather (maternal grandmother's father), on his lip, smoker     Lung cancer Other 60     great-aunt (maternal grandmother's sister), smoker     Cancer Other      great-grandmother (maternal grandmother's mother), bone cancer in her 80s     Cancer Other 75     great-uncle (maternal grandmother's brother), bile duct cancer     Social History     Social History     Marital status:      Spouse name: N/A     Number of children: N/A     Years of education: N/A     Occupational History     Not on file.     Social History Main Topics     Smoking status: Never Smoker     Smokeless tobacco: Not on file     Alcohol use Not on file     Drug use: Not on file     Sexual activity: Not on file     Other Topics Concern     Not on file     Social History Narrative     Current Outpatient Prescriptions on File Prior to Visit   Medication Sig Dispense Refill     albuterol (PROAIR HFA;PROVENTIL HFA;VENTOLIN HFA) 90 mcg/actuation inhaler Inhale 2 puffs every 6 (six) hours as needed for wheezing. 1 Inhaler 1     prenatal vitamin iron-folic acid 27mg-0.8mg (PRENATAL S) 27 mg iron- 800 mcg Tab tablet Take 1 tablet  by mouth daily.       tretinoin (RETIN-A) 0.05 % cream Apply 1 application topically at bedtime as needed.        [DISCONTINUED] azelaic acid (FINACEA) 15 % gel Apply 1 application topically every other day. After skin is thoroughly washed and patted dry, gently but thoroughly massage a thin film of azelaic acid cream into the affected area twice daily, in the morning and evening.       No current facility-administered medications on file prior to visit.      Objective  Vitals:    08/10/17 1032   BP: 100/70   Pulse: 63   Resp: 16   Temp: 96.9  F (36.1  C)   SpO2: 100%       General Appearance:  Alert, cooperative, no distress, appears stated age   Head:  Normocephalic, without obvious abnormality, atraumatic   Eyes:  PERRL, conjunctiva/corneas clear, EOM's intact   Ears:  Normal TM's and external ear canals, both ears   Nose: Nares normal, septum midline,mucosa normal, no drainage    Throat: Lips, mucosa, and tongue normal   Neck: Supple, symmetrical, trachea midline, no adenopathy;  thyroid: not enlarged, symmetric, no tenderness/mass/nodules; no carotid bruit or JVD   Lungs:   Respirations unlabored, breath sounds in all lung fields though decreased over the right, no crackles no wheezing, no rales   Heart:  Regular rate and rhythm, S1 and S2 normal, no murmur, rub, or gallop   Extremities: Extremities normal, atraumatic, no cyanosis or edema   Skin: Skin color, texture, turgor normal, no rashes or lesions   Neurologic: Normal, CN 2-12 intact       XR Chest PA and Lateral (Order [id number])   Imaging   Date: 8/10/2017 Department: [facility]/OB Ordering/Authorizing: Heladio Schmidt MD   Study Result   XR CHEST PA AND LATERAL  8/10/2017 11:16 AM     INDICATION: cough, SOB, recent pneumothorax  COMPARISON: 5/22/2017 and 5/26/2017     FINDINGS: Recurrent modest size right pneumothorax, slightly larger than on the exams from May. There is no shift of midline structures. Lungs otherwise clear. Heart  size normal.     NOTE: ABNORMAL REPORT     THE DICTATION ABOVE DESCRIBES AN ABNORMALITY FOR WHICH FOLLOW-UP IS NEEDED.     This report was electronically

## 2021-06-12 NOTE — ANESTHESIA POSTPROCEDURE EVALUATION
Patient: Anupama NGUYEN Stock  RIGHT THORACOSCOPY, RIGHT PARIETAL PLEURECTOMY, PLEURAL ABRAISION.  Anesthesia type: general    Patient location: PACU  Last vitals:   Vitals:    08/22/17 1219   BP: 93/54   Pulse: (!) 53   Resp: 13   Temp:    SpO2: 97%     Post vital signs: stable  Level of consciousness: awake and responds to simple questions  Post-anesthesia pain: pain control difficult, but patient appears comfortable with good RR.  Epidural infusing.  Post-anesthesia nausea and vomiting: no  Pulmonary: unassisted, return to baseline  Cardiovascular: stable and blood pressure at baseline  Hydration: adequate  Anesthetic events: no    QCDR Measures:  ASA# 11 - Grace-op Cardiac Arrest: ASA11B - Patient did NOT experience unanticipated cardiac arrest  ASA# 12 - Grace-op Mortality Rate: ASA12B - Patient did NOT die  ASA# 13 - PACU Re-Intubation Rate: ASA13B - Patient did NOT require a new airway mgmt  ASA# 10 - Composite Anes Safety: ASA10A - No serious adverse event  ASA# 38 - New Corneal Injury: ASA38A - No new exposure keratitis or corneal abrasion in PACU    Additional Notes:

## 2021-06-12 NOTE — PROGRESS NOTES
Call from patient's primary care clinic.  She had wedge resection last week with dr. Hunter.  Patient had PCP today for post op visit and cxr was done.  Showed pneumothorax.  Call from clinic--dr. Shane reviewed cxr and recommended patient go to ED for evaluation and probable chest tube placement.

## 2021-06-12 NOTE — PROGRESS NOTES
"  Subjective:    Anupama Miranda is a 36 y.o. female who presents for evaluation of hospital discharge follow-up for recurrent right pneumothorax.  Patient's initial pneumothorax occurred on 5/17/2017.  She was evaluated by pulmonology.  It was felt to be idiopathic.  She is a non-smoker.  Symptoms improved.  She then felt worsening of symptoms on 8/10/17 and was seen at our clinic.  Chest x-ray showed that pneumothorax had recurred.  After consultation with pulmonology, patient was sent to the ER and admitted to the hospital.  She was there from 8/10/17 to 8/15/17.  She had a chest tube placed and also right thoracoscopy with wedge resection of right upper lobe.  I reviewed previous 2 office notes and hospital discharge summary today.  Since discharge, she says that her breathing has not really improved.  It might be getting a little bit worse.  Symptoms seemed to worsen around Friday or Saturday.  She called our clinic, and then stated she would call her surgeon's office.  I was unable to find any further documentation of this in her chart.  Patient says she called her surgeon's office, who told her that if she was \"worsening\" she should go to the ER.  Patient felt it was not bad enough to go to the ER, she did not go.  She is here today for follow-up.  She says she feels like her chest wall pain and pain from the chest tubes/surgery are all improving slowly.  However her breathing and lungs feel worse.  She has had a slight cough.  She has occasional wheezing.  She has a rehab breathing machine at home that she has been practicing on.  She has not been able to get over 1750 on the machine.  She feels like she is suffocating when she lays down.  She can walk about 1 block before she gets out of breath.  She did not have any shortness of breath walking from the lobby to the exam room today.  No fevers.    Patient Active Problem List   Diagnosis     Intermittent asthma, uncomplicated     Infertility     Acne     " Seasonal allergies     Spontaneous pneumothorax     Asthma, exercise induced     No current facility-administered medications for this visit.     Current Outpatient Prescriptions:      ACETAMINOPHEN ORAL, Take by mouth., Disp: , Rfl:      albuterol (PROAIR HFA;PROVENTIL HFA;VENTOLIN HFA) 90 mcg/actuation inhaler, Inhale 2 puffs every 6 (six) hours as needed for wheezing., Disp: 1 Inhaler, Rfl: 1     azelaic acid (FINACEA) 15 % gel, Gently but thoroughly massage a thin film of azelaic acid cream into the affected area twice daily, Disp: 30 g, Rfl: 0     tretinoin (RETIN-A) 0.05 % cream, Apply 1 application topically at bedtime as needed. , Disp: , Rfl:      bisacodyl (DULCOLAX) 10 mg suppository, Insert 1 suppository (10 mg total) into the rectum daily., Disp: , Rfl: 0     HYDROcodone-acetaminophen 5-325 mg per tablet, Take 1-2 tablets by mouth every 4 (four) hours as needed., Disp: 20 tablet, Rfl: 0     polyethylene glycol (MIRALAX) 17 gram packet, Take 1 packet (17 g total) by mouth 2 (two) times a day., Disp: , Rfl: 0     prenatal vitamin iron-folic acid 27mg-0.8mg (PRENATAL S) 27 mg iron- 800 mcg Tab tablet, Take 1 tablet by mouth daily., Disp: , Rfl:      senna-docusate (PERICOLACE) 8.6-50 mg tablet, Take 1 tablet by mouth 2 (two) times a day., Disp: , Rfl: 0    Facility-Administered Medications Ordered in Other Visits:      Insert peripheral IV, , , Once **AND** Saline lock IV, , , Once **AND** [START ON 8/22/2017] sodium chloride 0.9 % flush 3 mL (NS), 3 mL, Intravenous, Line Care, Heather Bethea,      Objective:   Allergies:  Ambien [zolpidem]    Vitals:  Vitals:    08/21/17 1415   BP: 98/70   Pulse: 66   Resp: 20   SpO2: 100%     Body mass index is 21.62 kg/(m^2).    Vital signs reviewed.  General: Patient is alert and oriented x 3, she appears slightly anxious and slightly short of breath, appropriately groomed  Cardiac: regular rate and rhythm, no murmurs  Pulmonary: Left lung has normal breath sounds  "throughout, right lung has significantly decreased breath sounds throughout the entire lung, no crackles, rales, rhonchi, or wheezing noted    I reviewed chest x-ray, which showed collapsed right lung.  I compared this x-ray film to last x-ray film.  I also discussed this result with the radiologist.  XR CHEST PA AND LATERAL  8/21/2017 2:55 PM   INDICATION: Right upper lobe wedge resection 8/10/2014, now decreased breath sounds on right.  COMPARISON: 08/10/2017, 05/26/2017.   FINDINGS: There is a large right-sided pneumothorax that is larger than on the prior study. There is some atelectasis in the right base. The heart and pulmonary vasculature are normal. The left lung is clear.   This was called to Dr. Deya Arteaga.   This report was electronically interpreted by: Dr. Get Gibbs MD ON 08/21/2017 at 15:37    Assessment and Plan:   1.  Right sided pneumothorax, recurrent.  Patient is 10 days status post wedge resection of right upper lobe.  She was discharged from the hospital 6 days ago.  She been having slightly worsening breathing for the past few days.  I spoke with pulmonologist through his nurse today.  Radiologist and pulmonologist recommended admit to hospital for chest tube.  I reviewed x-ray findings with patient today.  She is quite dismayed that the pneumothorax has occurred again, and that she will have to return to the hospital.  I spent a great deal of time discussing this with her today, along with calling her  and discussing it with him over the phone.  They are reluctant to return to the ER, because they do not want to have another chest tube with \"unsuccessful\" results.  I reviewed with them that their concerns are valid, but that she needs treatment soon.  They would like a provider or assistant from the pulmonology clinic to call them to discuss plan.  Patient is going to go home and wait there with her .  Her vital signs are stable.  She says she is okay to drive.  " She declines ambulance or other transport.  I relayed this information to pulmonology nurse, who will address and contact patient.    40 minutes was spent in face to face time with patient and coordination of care.    This dictation uses voice recognition software, which may contain typographical errors.

## 2021-06-12 NOTE — ANESTHESIA POSTPROCEDURE EVALUATION
Patient: Anupama Miranda  RIGHT THORACOSCOPY WITH WEDGE RESECTION OF RIGHT UPPER LOBE  Anesthesia type: No value filed.    Patient location: [facility] Surgical Floor  Last vitals:   Vitals:    08/13/17 0349   BP: 120/68   Pulse: 60   Resp: 16   Temp: 36.9  C (98.4  F)   SpO2: 97%     Patient says pain at rest 0/10, increases significantly with movement and coughing.  Able to ambulate yesterday without too much problem.  Plan to keep epidural rate unchanged; told patient that increasing rate could affect leg strength.  Encouraged continued PCA use.  Epidural site clean/dry/intact.  Will pull epidural today if chest tubes removed so patient can take shower.  Otherwise, plan on d/c epidural tomorrow.

## 2021-06-13 NOTE — PROGRESS NOTES
Pulmonary Clinic Follow-up Visit    Assessment/Plan:  37 year old female never smoker with a history of exercise-induced asthma, seasonal allergies, recurrent spontaneous right pneumothoraces s/p thoracoscopic right upper lobe bleb resection and mechanical pleurodesis on 8/11/17, then recurrence requiring repeat right thoracoscopy with right parietal pleurectomy and mechanical right visceral pleurodesis on 8/22/17, presenting for follow-up.    Recurrent right pneumothorax: Has had three episodes, the first in May 2017 managed conservatively with chest tube, then S/P thoracoscopic right upper lobe bleb resection and mechanical pleurodesis on 8/11/17, then recurrence requiring repeat right thoracoscopy with right parietal pleurectomy and mechanical right visceral pleurodesis on 8/22/17.  Likely related to anatomical blebs, less likely catamenial though timing could be consistent with this.  No endometrial tissue seen grossly during thoracoscopies or pathologically on pleural biopsies.  Doing better now, working on increasing exercise tolerance; had previously been quite athletic and working toward that again.  Last CXR from late September showed some right basilar volume loss but otherwise clear lungs and no PTX.    Plan:  - continue to gradually increase exercise  - administered seasonal influenza vaccine today in clinic  - follow-up in 6 months or sooner as needed  - encouraged her to call any time with questions or concerning symptoms    CCx: recurrent right pneumothorax    HPI: 37 year old female never smoker with a history of exercise-induced asthma, seasonal allergies, recurrent spontaneous right pneumothoraces s/p thoracoscopic right upper lobe bleb resection and mechanical pleurodesis on 8/11/17, then recurrence requiring repeat right thoracoscopy with right parietal pleurectomy and mechanical right visceral pleurodesis on 8/22/17, presenting for follow-up.  Has been gradually increasing exercise.  Some  tingling/pain superficially around thoracoscopy ports but improving.  Wounds appear to be healing well.  Mild patchy erythematous rash that may be a reaction to recent surgical glue administration but improving.    ROS:  A 12-system review was obtained and was negative with the exception of the symptoms endorsed in the history of present illness.    PMH:  Exercise-induced asthma  Seasonal allergies  Spontaneous right PTX in May 2017 treated with right pleural pigtail catheter    PSH:  Past Surgical History:   Procedure Laterality Date     DILATION AND CURETTAGE OF UTERUS       LASIK       THORACOSCOPY Right 2017    Procedure: RIGHT THORACOSCOPY WITH WEDGE RESECTION OF RIGHT UPPER LOBE;  Surgeon: Get Hunter MD;  Location: [facility];  Service:      THORACOSCOPY Right 2017    Procedure: RIGHT THORACOSCOPY, RIGHT PARIETAL PLEURECTOMY, PLEURAL ABRAISION.;  Surgeon: Get Hunter MD;  Location: [facility];  Service:        Allergies:  Allergies   Allergen Reactions     Ambien [Zolpidem] Other (See Comments)     Hallucinations.       Family HX:  Family History   Problem Relation Age of Onset     Other Sister      cardiac ablation     Breast cancer Maternal Grandmother 69     Stroke Maternal Grandmother      Skin cancer Maternal Grandmother      basal cell carcinomas     Hypertension Maternal Grandfather      Coronary artery disease Maternal Grandfather      Prostate cancer Maternal Grandfather      diagnosed in his 60s     Lung disease Maternal Grandfather      unspecified, perhaps related to exposures working as a      Diabetes Paternal Grandmother      Stomach cancer Paternal Grandmother 80     Alcohol abuse Paternal Aunt      Breast cancer Maternal Aunt 55     grade 2 IDC, ER/AK+, HER2-     Diabetes Paternal Uncle       in his 40s     Skin cancer Maternal Aunt      basal cell carcinoma in her 50s     Colon cancer Other      great-grandfather (maternal  grandfather's father), diagnosed in his 60s     Breast cancer Other 80     great-aunt (maternal grandmother's sister)     Skin cancer Other      great-grandfather (maternal grandmother's father), on his lip, smoker     Lung cancer Other 60     great-aunt (maternal grandmother's sister), smoker     Cancer Other      great-grandmother (maternal grandmother's mother), bone cancer in her 80s     Cancer Other 75     great-uncle (maternal grandmother's brother), bile duct cancer       Social Hx:  Social History     Social History     Marital status:      Spouse name: N/A     Number of children: N/A     Years of education: N/A     Occupational History     Not on file.     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 1 Inhaler 1     azelaic acid (FINACEA) 15 % gel Gently but thoroughly massage a thin film of azelaic acid cream into the affected area twice daily 30 g 0     tretinoin (RETIN-A) 0.05 % cream Apply 1 application topically at bedtime as needed.        No current facility-administered medications for this visit.        Physical Exam:  /70  Pulse 68  Resp 20  Wt 158 lb 12.8 oz (72 kg)  SpO2 100% Comment: RA  BMI 22.15 kg/m2  Gen: alert, oriented, no distress  HEENT: nasal turbinates are unremarkable, no oropharyngeal lesions, no cervical or supraclavicular lymphadenopathy  CV: RRR, no M/G/R  Resp: decreased at right base, no focal crackles  or wheezes  Abd: soft, nontender, no palpable organomegaly  Skin: right thoracoscopy ports healing well, mild patchy erythematous rash in the area of healing surgical wounds, no open wounds, no drainage  Ext: no cyanosis, clubbing or edema  Neuro: alert, nonfocal    Labs:  reviewed    Imaging studies:  CXR (9/26/17):  - images directly reviewed, formal interpretation follows:  FINDINGS: Postop change with elevated right hemidiaphragm. Chest otherwise negative with no significant findings.    Basilio Mckinnon MD  Pulmonary and Critical Care Medicine  [facility]  Cell [phone]  Office [phone]  Pager [phone]

## 2021-06-13 NOTE — PROGRESS NOTES
"  Subjective:    Anupama Miranda is a 37 y.o. female with a history of spontaneous pneumothorax who presents for evaluation of shortness of breath.  Patient has had 3 spontaneous pneumothorax is over this past summer.  Most recent at the end of August.  I saw her on 9/5/2017 for hospital follow-up.  Please see that note for details.  She was recovering, with grossly normal chest x-ray at that visit.  She does note today that she has been feeling progressively better over the past 3 weeks.  However, yesterday and today she felt like she was having slightly worsening breathing and shortness of breath.  She is wondering if it was all the humidity.  They turned air conditioning on at her school and breathing did get somewhat better.  Today symptoms seemed to get a little worse again.  She was evaluated by the school nurse.  The school nurse told her that the left lung sounded normal, but the right lung sounded \"abnormal.\"  The nurse was concerned for possible pneumonia.  Patient has had shortness of breath today.  No fevers.  Her symptoms today feel \"kind of\" like they did before, when she had the pneumothorax.  However symptoms today are not quite as bothersome.    Patient Active Problem List   Diagnosis     Intermittent asthma, uncomplicated     Infertility     Acne     Seasonal allergies     Spontaneous pneumothorax     Asthma, exercise induced       Current Outpatient Prescriptions:      albuterol (PROAIR HFA;PROVENTIL HFA;VENTOLIN HFA) 90 mcg/actuation inhaler, Inhale 2 puffs every 6 (six) hours as needed for wheezing., Disp: 1 Inhaler, Rfl: 1     ACETAMINOPHEN ORAL, Take 1,000 mg by mouth 4 (four) times a day as needed. , Disp: , Rfl:      acetaminophen-codeine (TYLENOL #2) 300-15 mg per tablet, Take 1 tablet by mouth every 6 (six) hours as needed for pain., Disp: 15 tablet, Rfl: 0     azelaic acid (FINACEA) 15 % gel, Gently but thoroughly massage a thin film of azelaic acid cream into the affected area twice " daily, Disp: 30 g, Rfl: 0     polyethylene glycol (MIRALAX) 17 gram packet, Take 1 packet (17 g total) by mouth daily., Disp: , Rfl: 0     senna-docusate (PERICOLACE) 8.6-50 mg tablet, Take 1 tablet by mouth 2 (two) times a day., Disp: , Rfl: 0     tretinoin (RETIN-A) 0.05 % cream, Apply 1 application topically at bedtime as needed. , Disp: , Rfl:      Objective:   Allergies:  Ambien [zolpidem]    Vitals:  Vitals:    09/26/17 1632   BP: 98/72   Pulse: 70   Resp: 20   SpO2: 100%     Body mass index is 21.76 kg/(m^2).    Vital signs reviewed.  General: Patient is alert and oriented x 3, in no apparent distress  Ears: TMs are non-erythematous with good light reflex bilaterally  Throat: no erythema, edema or exudate noted  Lymphatic: no anterior cervical lymph node enlargement  Cardiac: regular rate and rhythm, no murmurs  Pulmonary: lungs clear to auscultation bilaterally, no crackles, rales, rhonchi, or wheezing noted    I personally reviewed grossly normal chest x-ray today.  XR CHEST PA AND LATERAL  9/26/2017 4:51 PM   INDICATION: Shortness of breath  COMPARISON: 09/05/2017   FINDINGS: Postop change with elevated right hemidiaphragm. Chest otherwise negative with no significant findings.   This report was electronically interpreted by: Dr. Get Pompa MD ON 09/27/2017 at 08:07    Assessment and Plan:   1.  Shortness of breath.  Reassurance provided.  Could be normal variant, other differential includes seasonal allergies, viral URI, anxiety.  She may re-start her claritin to see if that helps.  No obvious concerns for recurrent pneumothorax or pneumonia.  I will follow-up with radiologist read on chest x-ray.  Patient has a follow-up visit with pulmonology in 2-3 weeks.  If she has any concerns before then, she will call pulmonology to check in.  No further questions.    This dictation uses voice recognition software, which may contain typographical errors.

## 2021-06-14 NOTE — PROGRESS NOTES
Assessment/Plan:        Diagnoses and all orders for this visit:    Shortness of breath    Chest pain on breathing    Mild intermittent asthma without complication    37-year-old woman with a history of recurrent pneumothorax now shortness of breath, chest pain.  Fortunately her chest x-ray is normal.  There is a very small possibility for a small localized pneumothorax on the right side without lung collapse due to pleurodesis.  However given the normal chest x-ray the likelihood of this is low.    Most likely she is having neuropathic pain related to healing nerves that were cut during surgery which could be more irritated.  There may be some referral of this pain that is complicating the picture.    Her risk of plane flights in her current status is very low.  I think she is fine to fly if she is feeling better.    If she still having significant pain and difficulties we may have to consider a CT scan.    Ibuprofen for pain.              Subjective:    Patient ID: Anupama Miranda is a 37 y.o. female.    HPI  37-year-old woman with a history of multiple pleurodesis procedures for recurrent pneumothorax.  She is here with recurrent chest pain.  He has been going on for a few days.  It is worse with activity, with touch.  Is better with rest.  It is sharp.  Localizes the right side of the chest and to her incision what is also present in various parts on the left side.  It does not refer.  The pain in the left feels a little bit similar to previous pneumothorax.  Pertinent negatives include no hemoptysis, no fever, no tachycardia.  Pertinent positives include a little bit more shortness of breath.    She feels like she cannot quite get a breath all the way in.  When she takes a deep deep breath and she feels pain and tightness.  She can feel air slowly moving into the bottom of her lungs.    Review of Systems  14 point review of systems positive for congestion postnasal drip chest tightness shortness of breath  difficulty urinating chest pain environmental allergies dizziness anxiousness.  The remainder of 14 system review systems is negative.        Objective:    Physical Exam   Constitutional: No distress.   Cardiovascular: Exam reveals no friction rub.    No murmur heard.  Pulmonary/Chest: Effort normal and breath sounds normal. No respiratory distress.           Current Outpatient Prescriptions on File Prior to Visit   Medication Sig Dispense Refill     albuterol (PROAIR HFA;PROVENTIL HFA;VENTOLIN HFA) 90 mcg/actuation inhaler Inhale 2 puffs every 6 (six) hours as needed for wheezing. 1 Inhaler 1     azelaic acid (FINACEA) 15 % gel Gently but thoroughly massage a thin film of azelaic acid cream into the affected area twice daily 30 g 0     sulfacetamide sodium-sulfur 10-5 % (w/w) Clsr   6     tretinoin (RETIN-A) 0.05 % cream Apply 1 application topically at bedtime as needed.        No current facility-administered medications on file prior to visit.      LMP 11/01/2017    Medical History  Active Ambulatory (Non-Hospital) Problems    Diagnosis     Family history of breast cancer     Spontaneous pneumothorax     Mild intermittent asthma     Infertility     Acne     Seasonal allergies     Past Medical History:   Diagnosis Date     Miscarriage      Pleural effusion         Surgical History  She  has a past surgical history that includes Dilation and curettage of uterus; LASIK; Thoracoscopy (Right, 8/11/2017); and Thoracoscopy (Right, 8/22/2017).       Social History  Reviewed, elementary schoolteacher.   Allergies  Allergies   Allergen Reactions     Ambien [Zolpidem] Other (See Comments)     Hallucinations.                              Data Review - imaging, labs, and ekgs listed below were reviewed by me.  Chest XRay and chest CT images and EKG tracings interpreted personally.     Past Labs  Office Visit on 11/17/2017   Component Date Value     WBC 11/17/2017 5.4      RBC 11/17/2017 4.69      Hemoglobin 11/17/2017 14.5       Hematocrit 11/17/2017 44.4      MCV 11/17/2017 95      MCH 11/17/2017 31.0      MCHC 11/17/2017 32.7      RDW 11/17/2017 10.9*     Platelets 11/17/2017 221      MPV 11/17/2017 7.7      VENTRICULAR RATE 11/17/2017 69      ATRIAL RATE 11/17/2017 69      P-R INTERVAL 11/17/2017 142      QRS DURATION 11/17/2017 86      Q-T INTERVAL 11/17/2017 424      QTC CALCULATION (BEZET) 11/17/2017 454      P Axis 11/17/2017 27      R AXIS 11/17/2017 -16      T AXIS 11/17/2017 53      MUSE DIAGNOSIS 11/17/2017                      Value:Normal sinus rhythm  Normal ECG  When compared with ECG of 17-MAY-2017 16:11,  No significant change was found         Past Imaging personally reviewed chest x-ray from 1117 no pneumothorax seen.  Also reviewed chest x-ray from 926, 5 September, no pneumothorax stable right elevated hemidiaphragm.  Xr Chest Pa And Lateral    Result Date: 11/17/2017  XR CHEST PA AND LATERAL 11/17/2017 9:03 AM INDICATION: Dyspnea. History of 3 pneumothoraces this year. COMPARISON: 09/26/2017, 09/05/2017 FINDINGS: Stable right hemidiaphragm elevation. Heart size appears normal. Lungs appear clear. No pneumothorax seen. This report was electronically interpreted by: Dr. Rubio Kay MD ON 11/17/2017 at 09:15    Greater than 40 minutes spent, greater than 50% counseling and coordination of care.  Significant amount time spent in face-to-face counseling and review of previous testing, discussion of pathophysiology of her previous illness.  Discussion of modalities for treatment and possible examinations for her current symptoms.

## 2021-06-14 NOTE — PROGRESS NOTES
" Subjective    Anupama Miranda is a 37 y.o. female who presents for evaluation of dyspnea.    The patient presents today for worsening of dyspnea.  She has a history of mild intermittent asthma, which is exercise induced.  And in the last year, she has had 3 episodes of spontaneous pneumothorax in the right lung.  The first episode, in May was managed conservatively.  The second episode, in early August, was treated with thorascopic right upper lobe bleb resection and mechanical pleurodesis.  The third episode, in late August, was treated with parietal pleurectomy and mechanical visceral pleurodesis.    Since her most recent episode of pneumothorax, she reports having symptoms of tightness across the chest.  Describes this as a sensation that she needs to take more effort to take a breath in.  She has had soreness at her right lateral chest wall incision sites.  She has been trying to increase her physical activity.  However anything that involves jostling or balancing is really uncomfortable.  She states \"it feels like it is going to blow.\"  It is not really painful so much as her brain tells her that something bad is going to happen and she does not want to do it.  She tolerates the exercise bike well, and can get a good workout with this, including the good respiratory rate.  But bouncing or jostling required for use of elliptical , jump rope, or running is too uncomfortable for her.  She has been reassured that she cannot cause harm.  But she would like to increase her physical activity.    Over the last couple of days, she has noticed increased sensation of dyspnea and tightness.  He feels more sore around her surgery locations.  She has been feeling migratory, brief, \"poking\" pains on both sides of her chest.  She also notes that she has had a respiratory infection for the last couple of days with some posterior pharyngeal drainage.     Objective    Blood pressure 102/60, pulse 71, resp. rate 18, " "height 5' 11\" (1.803 m), weight 160 lb (72.6 kg), last menstrual period 11/01/2017, SpO2 100 %, not currently breastfeeding. Body mass index is 22.32 kg/(m^2). Patient reports that she has never smoked. She has never used smokeless tobacco.    Gen: Alert, no apparent distress.  Ears: canals clear, tympanic membranes clear with mild clear serous effusion  Eyes: no conjunctivitis.   Sinuses: non-tender.  Nose: Mildly erythematous nasal mucosa with purulent discharge.  Mouth: adequate dentition.   Tonsils/oropharynx: no tonsillar hypertrophy, posterior pharyngeal cobblestoning is noted  Neck: no significant lymphadenopathy, thyroid not enlarged, not tender.    Heart: Regular rate and rhythm, no murmurs.  Lungs: Clear to auscultation bilaterally, no increased work of breathing.  Abdomen: Soft, non-tender, non-distended, bowel sounds normal.  Extremities: No clubbing, cyanosis, edema.    Results for orders placed or performed in visit on 11/17/17   HM2(CBC w/o Differential)   Result Value Ref Range    WBC 5.4 4.0 - 11.0 thou/uL    RBC 4.69 3.80 - 5.40 mill/uL    Hemoglobin 14.5 12.0 - 16.0 g/dL    Hematocrit 44.4 35.0 - 47.0 %    MCV 95 80 - 100 fL    MCH 31.0 27.0 - 34.0 pg    MCHC 32.7 32.0 - 36.0 g/dL    RDW 10.9 (L) 11.0 - 14.5 %    Platelets 221 140 - 440 thou/uL    MPV 7.7 7.0 - 10.0 fL   Electrocardiogram Perform and Read   Result Value Ref Range    SYSTOLIC BLOOD PRESSURE  mmHg    DIASTOLIC BLOOD PRESSURE  mmHg    VENTRICULAR RATE 69 BPM    ATRIAL RATE 69 BPM    P-R INTERVAL 142 ms    QRS DURATION 86 ms    Q-T INTERVAL 424 ms    QTC CALCULATION (BEZET) 454 ms    P Axis 27 degrees    R AXIS -16 degrees    T AXIS 53 degrees    MUSE DIAGNOSIS       Normal sinus rhythm  Normal ECG  When compared with ECG of 17-MAY-2017 16:11,  No significant change was found       Xr Chest Pa And Lateral    Result Date: 11/17/2017  XR CHEST PA AND LATERAL 11/17/2017 9:03 AM INDICATION: Dyspnea. History of 3 pneumothoraces this year. " COMPARISON: 09/26/2017, 09/05/2017 FINDINGS: Stable right hemidiaphragm elevation. Heart size appears normal. Lungs appear clear. No pneumothorax seen. This report was electronically interpreted by: Dr. Rubio Kay MD ON 11/17/2017 at 09:15         Assessment & Plan      Anupama is a 37 y.o. female who is here today for breathing problems (Lungs feel like it is burning trouble with  breathing)      1. Dyspnea in a patient with previous pneumothoraces and mild intermittent asthma.  Chest x-ray does not show recurrent pneumothorax.  No significant anemia on lab.  EKG is unremarkable.  D-dimer is pending, though likelihood of clot is less likely.  Referral was made for her to see pulmonary this afternoon for follow-up.  Suspect pleuritic discomfort, related to recent upper respiratory infection.  Asthma exacerbation is possible, though no wheezing on exam today.  She might benefit from PFTs.  Discussed normal wound healing, and the associated sole-incisional numbness, and tenderness that one may experience for years following the procedure due to normal nerve healing.    1. Dyspnea  HM2(CBC w/o Differential)    D-dimer, Quantitative    Electrocardiogram Perform and Read    Ambulatory referral to Pulmonology   2. Spontaneous pneumothorax  XR Chest PA and Lateral   3. Family history of breast cancer     4. Mild intermittent asthma without complication  XR Chest PA and Lateral       Future Appointments  Date Time Provider Department Center   11/21/2017 8:00 AM Deya Arteaga PA-C [facility]        This transcription uses voice recognition software, which may contain typographical errors.

## 2021-06-14 NOTE — PROGRESS NOTES
Chief Complaint:  Chief Complaint   Patient presents with     Headache     and runny nose started yesterday     Sore Throat     started last Saturday     HPI:   Anupama Miranda is a 37 y.o. female with history of recurrent spontaneous pneumothorax, here with chief complaint of sore throat and headache.  Sore throat started Saturday, 2 days ago.  It hurts to swallow.  Headache and other symptoms started yesterday.  She has pain all over the top of her head, her scalp hurts, her teeth hurt.  No face pain.  Her nose is running and then also congested.  No significant coughing, no sneezing.  She is not sure if she has had a fever.  No known sick contacts.  She does have some seasonal allergies, has not been taking any medicine for those.  She has had allergy-type symptoms for the past 1-2 weeks.  DayQuil and NyQuil help for short while, maybe an hour.  No significant history of sinus infections.  Overall, she feels like she is doing worse for the past 2 days.  She is a non-smoker.      Current Outpatient Prescriptions:      albuterol (PROAIR HFA;PROVENTIL HFA;VENTOLIN HFA) 90 mcg/actuation inhaler, Inhale 2 puffs every 6 (six) hours as needed for wheezing., Disp: 1 Inhaler, Rfl: 1     metroNIDAZOLE (METROCREAM) 0.75 % cream, Apply topically 2 (two) times a day., Disp: , Rfl:      sulfacetamide sodium-sulfur 10-5 % (w/w) Clsr, , Disp: , Rfl: 6     amoxicillin (AMOXIL) 875 MG tablet, Take 1 tablet (875 mg total) by mouth 2 (two) times a day for 10 days., Disp: 20 tablet, Rfl: 0     azelaic acid (FINACEA) 15 % gel, Gently but thoroughly massage a thin film of azelaic acid cream into the affected area twice daily, Disp: 30 g, Rfl: 0     tretinoin (RETIN-A) 0.05 % cream, Apply 1 application topically at bedtime as needed. , Disp: , Rfl:     Physical Exam:  Vitals:    12/04/17 1022   BP: 94/64   Pulse: 78   Resp: 20   Temp: 97.4  F (36.3  C)   SpO2: 97%     Body mass index is 22.18 kg/(m^2).    Vital signs stable as recorded  above  General: Patient is alert and oriented x 3, in no apparent distress  Eyes: PERRL, EOMI bilaterally, no nystagmus  Ears: TMs non-erythematous with good light reflex bilaterally  Nose: Minimal erythema and edema in bilateral nares  Face: No pain with percussion of frontal or maxillary sinuses bilaterally  Throat: no erythema, edema, or exudate noted  Lymphatic: No cervical lymph node enlargement  Cardiac: Regular rate and rhythm; no murmurs  Pulmonary: Lung clear to auscultation bilaterally; no crackles, rales, rhonchi, or wheezing noted  Musculoskeletal: patient walks in a normal tandem gait, normal muscle strength  Neuro: Cranial Nerves 2 through 12 grossly intact, negative Romberg Test, normal finger-to-nose cerebellar coordination testing    Results for orders placed or performed in visit on 12/04/17   Rapid Strep A Screen-Throat   Result Value Ref Range    Rapid Strep A Antigen No Group A Strep detected, presumptive negative No Group A Strep detected, presumptive negative   Strep culture pending.    Assessment/Plan:  Acute URI, likely viral.  I will follow-up with strep culture tomorrow.  Possible sinusitis.  I discussed continuing with symptomatic treatment as she has been.  She could also try allergy medicines if that is helpful.  We discussed the pros and cons of prescribing an antibiotic at this time.  She is not sure if she would like one or not.    Prescription sent for amoxicillin to her pharmacy.  I reviewed proper use with her and possible side effects.  She will follow-up in 1 week if no better, sooner if worsening.    This dictation uses voice recognition software, which may contain typographical errors.

## 2021-06-15 PROBLEM — J93.83 SPONTANEOUS PNEUMOTHORAX: Status: ACTIVE | Noted: 2017-05-24

## 2021-06-15 NOTE — PROGRESS NOTES
[facility] clinic EXAM note      Chief Complaint   Patient presents with     Cough     Since Monday- feels a popping sounds in her lungs when she coughs        Assessment & Plan    Problem List Items Addressed This Visit     None      Visit Diagnoses     Cough    -  Primary: Chest x-ray negative for any acute findings.  Postsurgical changes noted and stable.  This is my first time listening to her lungs, but on chart review it does seem like previous visits her lungs have been clear to auscultation.  And I definitely hear some rhonchi and crackles today on exam.  Since chest x-ray is negative she is afebrile overall well-appearing I doubt pneumonia.  Suspect viral bronchitis picture.  Will do prednisone burst for 5 days.  Rapid influenza test was also negative.  Reasons to call or return discussed in detail including spiking fever or increased respiratory distress.  I do think patient suffering a little bit of PTSD from her spontaneous pneumothorax which is understandable.  Would recommend counseling if has not done this yet.    Relevant Medications    predniSONE (DELTASONE) 20 MG tablet    Other Relevant Orders    XR Chest 2 Views (Completed)    Influenza A/B Rapid Test (Completed)          History    Anupama Miranda is a 37 y.o.  female who presents for the following issues:    Since Monday she has felt a popping sound in her right lung.  States she can kind of feel it reverberating.  She is concerned because she has history of recurrent spontaneous pneumothorax.  First occurrence was in May this healed and second occurrence was in August.  Had have surgery to fix it and then had to have a repeat surgery as well.  Also per patient has exercise-induced asthma.  Chart says mild intermittent.  So symptoms just started with this popping sound that made her nervous.  Then she started with a cough and a runny nose yesterday.  She says it is not that bad.  She is not feeling that terrible.  She does not think she has  any fevers but did not take her temp.  Cough is kind a raspy.  She denies any sinus tenderness, sore throat, ear pain, headaches, nausea, vomiting, diarrhea.  She denies any body aches or chills.  She also feels like her incision site where her surgery was is sore/ tender.  And she is is overall feeling crummy and wanted to be seen.  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  The school nurse said her lungs sound like an old person's lungs.    mEDICATIONS    Current Outpatient Prescriptions on File Prior to Visit   Medication Sig Dispense Refill     albuterol (PROAIR HFA;PROVENTIL HFA;VENTOLIN HFA) 90 mcg/actuation inhaler Inhale 2 puffs every 6 (six) hours as needed for wheezing. 1 Inhaler 1     metroNIDAZOLE (METROCREAM) 0.75 % cream Apply topically 2 (two) times a day.       sulfacetamide sodium-sulfur 10-5 % (w/w) Clsr   6     tretinoin (RETIN-A) 0.05 % cream Apply 1 application topically at bedtime as needed.        azelaic acid (FINACEA) 15 % gel Gently but thoroughly massage a thin film of azelaic acid cream into the affected area twice daily 30 g 0     No current facility-administered medications on file prior to visit.        Pertinent past medical, surgical, social and family history reviewed and updated in [facility].    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     Physical Exam    /62 (Patient Site: Left Arm, Patient Position: Sitting, Cuff Size: Adult Regular)  Pulse 62  Ht 5' 11\" (1.803 m)  Wt 163 lb (73.9 kg)  SpO2 99%  BMI 22.73 kg/m2  GEN:  37 y.o. female sitting comfortably in no apparent distress.   HEENT: EOMI, no scleral icterus, buccal mucosa moist, no erythema or tonsillar exudate.  TMs clear with good cone light reflex.  Turbinates a little pale with mucousy discharge bilaterally.  No sinus tenderness palpation.  Neck: Very mild cervical anterior lymphadenopathy, nontender.  CHEST/LUNG: No respiratory distress.  Intermittent mild rhonchi noted throughout lung fields, and I do hear a little crackle/popping noise in the right lower lung base.  CV: RRR, S1, S2 normal; no murmurs, rubs or gallops. No edema.   SKIN: warm, dry, no rashes or lesions.  Right sided lung scars well-healed.  I do not see any overlying lesions.  NEURO: Gait normal, coordination intact  PSYCH: Appears anxious little reserved.      Follow up: As needed if symptoms worsen or fail to improve.    Elisabeth Bonilla    "

## 2021-06-16 PROBLEM — G89.18 POST-OPERATIVE PAIN: Status: ACTIVE | Noted: 2018-03-18

## 2021-06-16 PROBLEM — Z80.3 FAMILY HISTORY OF BREAST CANCER: Status: ACTIVE | Noted: 2017-11-17

## 2021-06-16 NOTE — PROGRESS NOTES
"  Subjective:     Anupama Miranda is a 37 y.o. female who presents for an annual exam.     Other concerns today:  1.  Sore throat.  She has had a sore throat for a few days.  It hurts a little bit to swallow.  She feels like she has a lot of drainage in the back of her throat.  No coughing, no fever.  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    2.  Nerve pain.  She has had pain at the incision sites from her multiple procedures for her recurrent pneumothorax.  Last surgery was August 2017.  She has had ongoing pain at the incision sites since then.  She describes it as a \"nerve pain.\"  She says the incision sites are tingling and she often gets sharp pain there.  She denies any burning.  She says sensation is bothersome enough that often she does not wear a bra.  She likes to run, but symptoms are bothersome enough that she does not really run that much anymore.  Since initial surgery, she feels like symptoms are getting slightly better, but still bothersome.  She also notes she has an area of numbness on the skin, radiating out from the incision sites.    3.  Occasional dizziness.  She notes occasional dizziness, especially if she goes from sitting to standing, or if she tilts her head down to comb her hair and then lifts her head up fast.  She has regular monthly periods, no significant heavy bleeding.    She has not had any further incidence of spontaneous pneumothorax since her last visit.  She continues to sometimes get shortness of breath.  She has noticed that her exercise tolerance is not back up to what it used to be.  She is a runner.  She has not followed up with pulmonology in some time.  She is planning to do that over the next few months.  Of note, she is moving to [facility] this summer with her , for his work.  She is currently looking for a job in [place], which is somewhat stressful.    Immunization History   Administered Date(s) Administered " "    Influenza, inj, historic,unspecified 10/18/2016     Influenza,seasonal quad, PF, 36+MOS 10/19/2017     Tdap 08/10/2017     Gynecologic History  Patient's last menstrual period was 02/21/2018 (exact date).  Contraception: none  Last Pap: 2016  Last mammogram: 2 yrs ago?  \"for baseline\"      Current Outpatient Prescriptions:      albuterol (PROAIR HFA;PROVENTIL HFA;VENTOLIN HFA) 90 mcg/actuation inhaler, Inhale 2 puffs every 6 (six) hours as needed for wheezing., Disp: 1 Inhaler, Rfl: 1     metroNIDAZOLE (METROCREAM) 0.75 % cream, Apply topically 2 (two) times a day., Disp: , Rfl:      sulfacetamide sodium-sulfur 10-5 % (w/w) Clsr, , Disp: , Rfl: 6     tretinoin (RETIN-A) 0.05 % cream, Apply 1 application topically at bedtime as needed. , Disp: , Rfl:   Past Medical History:   Diagnosis Date     Miscarriage      Pleural effusion      Past Surgical History:   Procedure Laterality Date     DILATION AND CURETTAGE OF UTERUS       LASIK       THORACOSCOPY Right 8/11/2017    Procedure: RIGHT THORACOSCOPY WITH WEDGE RESECTION OF RIGHT UPPER LOBE;  Surgeon: Get Hunter MD;  Location: [facility] OR;  Service:      THORACOSCOPY Right 8/22/2017    Procedure: RIGHT THORACOSCOPY, RIGHT PARIETAL PLEURECTOMY, PLEURAL ABRAISION.;  Surgeon: Get Hunter MD;  Location: [facility] OR;  Service:      Ambien [zolpidem]  Family History   Problem Relation Age of Onset     Other Sister      cardiac ablation     Breast cancer Maternal Grandmother 69     Stroke Maternal Grandmother      Skin cancer Maternal Grandmother      basal cell carcinomas     Hypertension Maternal Grandfather      Coronary artery disease Maternal Grandfather      Prostate cancer Maternal Grandfather      diagnosed in his 60s     Lung disease Maternal Grandfather      unspecified, perhaps related to exposures working as a      Diabetes Paternal Grandmother      Stomach cancer Paternal Grandmother 80     Alcohol abuse Paternal " Aunt      Breast cancer Maternal Aunt 55     grade 2 IDC, ER/NE+, HER2-     Diabetes Paternal Uncle       in his 40s     Skin cancer Maternal Aunt      basal cell carcinoma in her 50s     Colon cancer Other      great-grandfather (maternal grandfather's father), diagnosed in his 60s     Breast cancer Other 80     great-aunt (maternal grandmother's sister)     Skin cancer Other      great-grandfather (maternal grandmother's father), on his lip, smoker     Lung cancer Other 60     great-aunt (maternal grandmother's sister), smoker     Cancer Other      great-grandmother (maternal grandmother's mother), bone cancer in her 80s     Cancer Other 75     great-uncle (maternal grandmother's brother), bile duct cancer     Social History     Social History     Marital status:      Spouse name: Ld Miranda     Number of children: N/A     Years of education: N/A     Occupational History     Teacher      Social History Main Topics     Smoking status: Never Smoker     Smokeless tobacco: Never Used     Alcohol use Yes      Comment: 2-3 drinks/week     Drug use: No     Sexual activity: Yes     Partners: Male     Birth control/ protection: None      Comment: Planning pregnancy (h/o infertility)     Other Topics Concern     Not on file     Social History Narrative    16 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    Objective:     Vitals:    18 0839   BP: 110/70   Pulse: 60   Resp: 16   Temp: 97.8  F (36.6  C)   SpO2: 99%     Body mass index is 21.29 kg/(m^2).    Physical Exam:  General: Patient is alert and Oriented x 3, in no apparent distress.  Well groomed with normal affect.  HEENT, Thyroid, Lymphatic, Cardiac, Pulmonary, GI, Musculoskeletal, and Neuro exams were completed today and grossly normal.  Breast Exam: No lumps,  skin changes, lymphadenopathy, or nipple discharge noted bilaterally.  Genitourinary Exam: Deferred.  Skin: Several small well-healed incisions present on the anterior mid abdomen.  Incision sites are painful to palpation, pain possibly out of proportion with exam findings, patient also notes numbness in the surrounding skin area, skin appears completely healthy, no signs of infection    Results for orders placed or performed in visit on 03/16/18   Rapid Strep A Screen-Throat   Result Value Ref Range    Rapid Strep A Antigen No Group A Strep detected, presumptive negative No Group A Strep detected, presumptive negative   Hemoglobin   Result Value Ref Range    Hemoglobin 14.0 12.0 - 16.0 g/dL   Glucose   Result Value Ref Range    Glucose 97 70 - 99 mg/dL    Patient Fasting > 8hrs? Yes    Other labs pending.    Assessment and Plan:     1. Physical Exam.  Health Maintenance discussed with patient as appropriate for age and risk factors.  She is up-to-date on her Pap test.  She declines STD testing today.  Fasting glucose and lipid panel ordered today.    2.  History of recurrent spontaneous pneumothorax.  Patient has been generally doing well recently.  She plans on following up with pulmonology sometime this summer before she moves.    3.  Postop pain anterior abdomen.  Pain and numbness around the surgical incision sites related to her pneumothorax episodes.  I discussed with her primary treatment options would be to try gabapentin or follow-up with surgeon.  She would like to follow up with surgeon.  Referral placed today.  I also discussed that acupuncture may be helpful, if her insurance would cover it.    4.  Acute pharyngitis, likely viral.  Rapid strep test is negative, culture is pending.  I reviewed symptomatic treatment.  I will follow-up with strep culture.    5.  Dizziness.  Normal hemoglobin and neuro exam today.  Symptoms do not occur all the time.  Based on her history, it sounds like it may be  orthostatic hypotension.  I discussed symptomatic treatment for this.  She will continue to monitor.    6.  Infertility.  Patient and her  have had a few workups for this in the past.  They have had various levels of follow-through, partially due to moving to new cities.  Patient has not had addressed infertility with a specialist recently.  She will wait in this until after she moves this summer.    This dictation uses voice recognition software, which may contain typographical errors.

## 2021-06-27 ENCOUNTER — HEALTH MAINTENANCE LETTER (OUTPATIENT)
Age: 41
End: 2021-06-27

## 2021-07-03 NOTE — ADDENDUM NOTE
Addendum Note by Mary Segovia MD at 10/13/2017 10:10 AM     Author: Mary Segovia MD Service: -- Author Type: Physician    Filed: 10/13/2017 10:10 AM Date of Service: 10/13/2017 10:10 AM Status: Signed    : Mary Segovia MD (Physician)       Addendum  created 10/13/17 1010 by Mary Segovia MD    Anesthesia Intra Blocks edited, Sign clinical note

## 2021-07-03 NOTE — ADDENDUM NOTE
Addendum Note by Celena Adams MD at 8/23/2017  7:16 AM     Author: Celena Adams MD Service: -- Author Type: Physician    Filed: 8/23/2017  7:16 AM Date of Service: 8/23/2017  7:16 AM Status: Signed    : Celena Adams MD (Physician)       Addendum  created 08/23/17 0716 by Celena Adams MD    Anesthesia Event edited, Procedure Event Log accessed, Sign clinical note

## 2021-07-03 NOTE — ADDENDUM NOTE
Addendum Note by Stephen Adam MD at 8/26/2017 11:29 PM     Author: Stephen Adam MD Service: -- Author Type: Physician    Filed: 8/26/2017 11:29 PM Date of Service: 8/26/2017 11:29 PM Status: Signed    : Stephen Adam MD (Physician)       Addendum  created 08/26/17 2329 by Stephen Adam MD    Sign clinical note

## 2021-07-03 NOTE — ADDENDUM NOTE
Addendum Note by Dariusz Amaro MD at 8/24/2017  6:46 AM     Author: Dariusz Amaro MD Service: -- Author Type: Physician    Filed: 8/24/2017  6:46 AM Date of Service: 8/24/2017  6:46 AM Status: Signed    : Dariusz Amaro MD (Physician)       Addendum  created 08/24/17 0646 by Dariusz Amaro MD    Sign clinical note

## 2021-10-17 ENCOUNTER — HEALTH MAINTENANCE LETTER (OUTPATIENT)
Age: 41
End: 2021-10-17

## 2022-07-24 ENCOUNTER — HEALTH MAINTENANCE LETTER (OUTPATIENT)
Age: 42
End: 2022-07-24

## 2022-10-02 ENCOUNTER — HEALTH MAINTENANCE LETTER (OUTPATIENT)
Age: 42
End: 2022-10-02

## 2023-08-12 ENCOUNTER — HEALTH MAINTENANCE LETTER (OUTPATIENT)
Age: 43
End: 2023-08-12

## 2024-03-09 ENCOUNTER — HEALTH MAINTENANCE LETTER (OUTPATIENT)
Age: 44
End: 2024-03-09